# Patient Record
Sex: FEMALE | Race: WHITE | HISPANIC OR LATINO | URBAN - METROPOLITAN AREA
[De-identification: names, ages, dates, MRNs, and addresses within clinical notes are randomized per-mention and may not be internally consistent; named-entity substitution may affect disease eponyms.]

---

## 2017-05-25 ENCOUNTER — OUTPATIENT (OUTPATIENT)
Dept: OUTPATIENT SERVICES | Facility: HOSPITAL | Age: 42
LOS: 1 days | Discharge: HOME | End: 2017-05-25

## 2017-06-22 ENCOUNTER — OUTPATIENT (OUTPATIENT)
Dept: OUTPATIENT SERVICES | Facility: HOSPITAL | Age: 42
LOS: 1 days | Discharge: HOME | End: 2017-06-22

## 2017-06-22 DIAGNOSIS — F25.9 SCHIZOAFFECTIVE DISORDER, UNSPECIFIED: ICD-10-CM

## 2017-06-28 DIAGNOSIS — F19.94 OTHER PSYCHOACTIVE SUBSTANCE USE, UNSPECIFIED WITH PSYCHOACTIVE SUBSTANCE-INDUCED MOOD DISORDER: ICD-10-CM

## 2017-06-28 DIAGNOSIS — E78.2 MIXED HYPERLIPIDEMIA: ICD-10-CM

## 2017-06-28 DIAGNOSIS — B20 HUMAN IMMUNODEFICIENCY VIRUS [HIV] DISEASE: ICD-10-CM

## 2017-06-28 DIAGNOSIS — B97.7 PAPILLOMAVIRUS AS THE CAUSE OF DISEASES CLASSIFIED ELSEWHERE: ICD-10-CM

## 2017-07-12 ENCOUNTER — OUTPATIENT (OUTPATIENT)
Dept: OUTPATIENT SERVICES | Facility: HOSPITAL | Age: 42
LOS: 1 days | Discharge: HOME | End: 2017-07-12

## 2017-07-12 DIAGNOSIS — B97.7 PAPILLOMAVIRUS AS THE CAUSE OF DISEASES CLASSIFIED ELSEWHERE: ICD-10-CM

## 2017-07-12 DIAGNOSIS — N64.3 GALACTORRHEA NOT ASSOCIATED WITH CHILDBIRTH: ICD-10-CM

## 2017-07-12 DIAGNOSIS — F25.9 SCHIZOAFFECTIVE DISORDER, UNSPECIFIED: ICD-10-CM

## 2017-07-20 ENCOUNTER — OUTPATIENT (OUTPATIENT)
Dept: OUTPATIENT SERVICES | Facility: HOSPITAL | Age: 42
LOS: 1 days | Discharge: HOME | End: 2017-07-20

## 2017-07-20 DIAGNOSIS — F19.94 OTHER PSYCHOACTIVE SUBSTANCE USE, UNSPECIFIED WITH PSYCHOACTIVE SUBSTANCE-INDUCED MOOD DISORDER: ICD-10-CM

## 2017-07-20 DIAGNOSIS — B20 HUMAN IMMUNODEFICIENCY VIRUS [HIV] DISEASE: ICD-10-CM

## 2017-07-20 DIAGNOSIS — F25.9 SCHIZOAFFECTIVE DISORDER, UNSPECIFIED: ICD-10-CM

## 2017-07-24 ENCOUNTER — OUTPATIENT (OUTPATIENT)
Dept: OUTPATIENT SERVICES | Facility: HOSPITAL | Age: 42
LOS: 1 days | Discharge: HOME | End: 2017-07-24

## 2017-07-24 DIAGNOSIS — Z12.31 ENCOUNTER FOR SCREENING MAMMOGRAM FOR MALIGNANT NEOPLASM OF BREAST: ICD-10-CM

## 2017-07-24 DIAGNOSIS — F25.9 SCHIZOAFFECTIVE DISORDER, UNSPECIFIED: ICD-10-CM

## 2017-08-01 ENCOUNTER — APPOINTMENT (OUTPATIENT)
Dept: SURGERY | Facility: CLINIC | Age: 42
End: 2017-08-01
Payer: MEDICAID

## 2017-08-01 VITALS
SYSTOLIC BLOOD PRESSURE: 116 MMHG | WEIGHT: 161 LBS | DIASTOLIC BLOOD PRESSURE: 74 MMHG | HEIGHT: 67 IN | BODY MASS INDEX: 25.27 KG/M2

## 2017-08-01 DIAGNOSIS — Z80.3 FAMILY HISTORY OF MALIGNANT NEOPLASM OF BREAST: ICD-10-CM

## 2017-08-01 DIAGNOSIS — Z87.898 PERSONAL HISTORY OF OTHER SPECIFIED CONDITIONS: ICD-10-CM

## 2017-08-01 DIAGNOSIS — Z81.8 FAMILY HISTORY OF OTHER MENTAL AND BEHAVIORAL DISORDERS: ICD-10-CM

## 2017-08-01 DIAGNOSIS — Z86.59 PERSONAL HISTORY OF OTHER MENTAL AND BEHAVIORAL DISORDERS: ICD-10-CM

## 2017-08-01 DIAGNOSIS — Z87.19 PERSONAL HISTORY OF OTHER DISEASES OF THE DIGESTIVE SYSTEM: ICD-10-CM

## 2017-08-01 PROCEDURE — 46600 DIAGNOSTIC ANOSCOPY SPX: CPT

## 2017-08-01 PROCEDURE — 99203 OFFICE O/P NEW LOW 30 MIN: CPT | Mod: 25

## 2017-08-02 PROBLEM — Z80.3 FAMILY HISTORY OF MALIGNANT NEOPLASM OF BREAST: Status: ACTIVE | Noted: 2017-08-01

## 2017-08-02 PROBLEM — Z81.8 FAMILY HISTORY OF BIPOLAR DISORDER: Status: ACTIVE | Noted: 2017-08-01

## 2017-08-02 PROBLEM — Z87.898 HISTORY OF HEARTBURN: Status: RESOLVED | Noted: 2017-08-01 | Resolved: 2017-08-02

## 2017-08-02 PROBLEM — Z87.19 HISTORY OF INDIGESTION: Status: RESOLVED | Noted: 2017-08-01 | Resolved: 2017-08-02

## 2017-08-02 PROBLEM — Z86.59 HISTORY OF MENTAL DISORDER: Status: RESOLVED | Noted: 2017-08-01 | Resolved: 2017-08-02

## 2017-08-02 RX ORDER — QUETIAPINE FUMARATE 50 MG/1
50 TABLET ORAL
Refills: 0 | Status: ACTIVE | COMMUNITY

## 2017-08-02 RX ORDER — MIRTAZAPINE 15 MG/1
15 TABLET, FILM COATED ORAL
Refills: 0 | Status: ACTIVE | COMMUNITY

## 2017-08-02 RX ORDER — BENZTROPINE MESYLATE 2 MG/1
2 TABLET ORAL
Refills: 0 | Status: ACTIVE | COMMUNITY

## 2017-08-02 RX ORDER — PERPHENAZINE 8 MG/1
8 TABLET ORAL
Refills: 0 | Status: ACTIVE | COMMUNITY

## 2017-08-17 ENCOUNTER — OUTPATIENT (OUTPATIENT)
Dept: OUTPATIENT SERVICES | Facility: HOSPITAL | Age: 42
LOS: 1 days | Discharge: HOME | End: 2017-08-17

## 2017-08-17 DIAGNOSIS — J44.9 CHRONIC OBSTRUCTIVE PULMONARY DISEASE, UNSPECIFIED: ICD-10-CM

## 2017-08-17 DIAGNOSIS — F25.9 SCHIZOAFFECTIVE DISORDER, UNSPECIFIED: ICD-10-CM

## 2017-08-17 DIAGNOSIS — F19.94 OTHER PSYCHOACTIVE SUBSTANCE USE, UNSPECIFIED WITH PSYCHOACTIVE SUBSTANCE-INDUCED MOOD DISORDER: ICD-10-CM

## 2017-08-17 DIAGNOSIS — E78.2 MIXED HYPERLIPIDEMIA: ICD-10-CM

## 2017-08-17 DIAGNOSIS — B20 HUMAN IMMUNODEFICIENCY VIRUS [HIV] DISEASE: ICD-10-CM

## 2017-09-14 ENCOUNTER — OUTPATIENT (OUTPATIENT)
Dept: OUTPATIENT SERVICES | Facility: HOSPITAL | Age: 42
LOS: 1 days | Discharge: HOME | End: 2017-09-14

## 2017-09-14 DIAGNOSIS — F25.9 SCHIZOAFFECTIVE DISORDER, UNSPECIFIED: ICD-10-CM

## 2017-09-14 DIAGNOSIS — B20 HUMAN IMMUNODEFICIENCY VIRUS [HIV] DISEASE: ICD-10-CM

## 2017-09-14 DIAGNOSIS — E78.2 MIXED HYPERLIPIDEMIA: ICD-10-CM

## 2017-10-12 ENCOUNTER — OUTPATIENT (OUTPATIENT)
Dept: OUTPATIENT SERVICES | Facility: HOSPITAL | Age: 42
LOS: 1 days | Discharge: HOME | End: 2017-10-12

## 2017-10-12 DIAGNOSIS — F19.94 OTHER PSYCHOACTIVE SUBSTANCE USE, UNSPECIFIED WITH PSYCHOACTIVE SUBSTANCE-INDUCED MOOD DISORDER: ICD-10-CM

## 2017-10-12 DIAGNOSIS — F25.9 SCHIZOAFFECTIVE DISORDER, UNSPECIFIED: ICD-10-CM

## 2017-10-12 DIAGNOSIS — B20 HUMAN IMMUNODEFICIENCY VIRUS [HIV] DISEASE: ICD-10-CM

## 2017-10-12 DIAGNOSIS — Z23 ENCOUNTER FOR IMMUNIZATION: ICD-10-CM

## 2017-11-09 ENCOUNTER — OUTPATIENT (OUTPATIENT)
Dept: OUTPATIENT SERVICES | Facility: HOSPITAL | Age: 42
LOS: 1 days | Discharge: HOME | End: 2017-11-09

## 2017-11-09 DIAGNOSIS — J44.9 CHRONIC OBSTRUCTIVE PULMONARY DISEASE, UNSPECIFIED: ICD-10-CM

## 2017-11-09 DIAGNOSIS — F25.9 SCHIZOAFFECTIVE DISORDER, UNSPECIFIED: ICD-10-CM

## 2017-11-09 DIAGNOSIS — B20 HUMAN IMMUNODEFICIENCY VIRUS [HIV] DISEASE: ICD-10-CM

## 2017-11-09 DIAGNOSIS — E78.2 MIXED HYPERLIPIDEMIA: ICD-10-CM

## 2018-02-01 ENCOUNTER — OUTPATIENT (OUTPATIENT)
Dept: OUTPATIENT SERVICES | Facility: HOSPITAL | Age: 43
LOS: 1 days | Discharge: HOME | End: 2018-02-01

## 2018-02-01 DIAGNOSIS — B20 HUMAN IMMUNODEFICIENCY VIRUS [HIV] DISEASE: ICD-10-CM

## 2018-02-04 DIAGNOSIS — F25.9 SCHIZOAFFECTIVE DISORDER, UNSPECIFIED: ICD-10-CM

## 2018-02-27 ENCOUNTER — APPOINTMENT (OUTPATIENT)
Dept: SURGERY | Facility: CLINIC | Age: 43
End: 2018-02-27
Payer: MEDICAID

## 2018-02-27 VITALS
DIASTOLIC BLOOD PRESSURE: 78 MMHG | BODY MASS INDEX: 25.74 KG/M2 | HEIGHT: 67 IN | WEIGHT: 164 LBS | SYSTOLIC BLOOD PRESSURE: 118 MMHG

## 2018-02-27 PROCEDURE — 99213 OFFICE O/P EST LOW 20 MIN: CPT

## 2018-03-01 ENCOUNTER — OUTPATIENT (OUTPATIENT)
Dept: OUTPATIENT SERVICES | Facility: HOSPITAL | Age: 43
LOS: 1 days | Discharge: HOME | End: 2018-03-01

## 2018-03-01 DIAGNOSIS — E66.3 OVERWEIGHT: ICD-10-CM

## 2018-03-01 DIAGNOSIS — E78.2 MIXED HYPERLIPIDEMIA: ICD-10-CM

## 2018-03-01 DIAGNOSIS — B20 HUMAN IMMUNODEFICIENCY VIRUS [HIV] DISEASE: ICD-10-CM

## 2018-03-01 DIAGNOSIS — F19.94 OTHER PSYCHOACTIVE SUBSTANCE USE, UNSPECIFIED WITH PSYCHOACTIVE SUBSTANCE-INDUCED MOOD DISORDER: ICD-10-CM

## 2018-03-01 DIAGNOSIS — J44.9 CHRONIC OBSTRUCTIVE PULMONARY DISEASE, UNSPECIFIED: ICD-10-CM

## 2018-03-29 ENCOUNTER — OUTPATIENT (OUTPATIENT)
Dept: OUTPATIENT SERVICES | Facility: HOSPITAL | Age: 43
LOS: 1 days | Discharge: HOME | End: 2018-03-29

## 2018-03-29 DIAGNOSIS — J44.9 CHRONIC OBSTRUCTIVE PULMONARY DISEASE, UNSPECIFIED: ICD-10-CM

## 2018-03-29 DIAGNOSIS — B20 HUMAN IMMUNODEFICIENCY VIRUS [HIV] DISEASE: ICD-10-CM

## 2018-03-29 DIAGNOSIS — E78.2 MIXED HYPERLIPIDEMIA: ICD-10-CM

## 2018-03-29 DIAGNOSIS — F19.94 OTHER PSYCHOACTIVE SUBSTANCE USE, UNSPECIFIED WITH PSYCHOACTIVE SUBSTANCE-INDUCED MOOD DISORDER: ICD-10-CM

## 2018-03-29 DIAGNOSIS — E66.3 OVERWEIGHT: ICD-10-CM

## 2018-04-26 ENCOUNTER — OUTPATIENT (OUTPATIENT)
Dept: OUTPATIENT SERVICES | Facility: HOSPITAL | Age: 43
LOS: 1 days | Discharge: HOME | End: 2018-04-26

## 2018-04-26 DIAGNOSIS — J44.9 CHRONIC OBSTRUCTIVE PULMONARY DISEASE, UNSPECIFIED: ICD-10-CM

## 2018-04-26 DIAGNOSIS — E66.3 OVERWEIGHT: ICD-10-CM

## 2018-04-26 DIAGNOSIS — F19.94 OTHER PSYCHOACTIVE SUBSTANCE USE, UNSPECIFIED WITH PSYCHOACTIVE SUBSTANCE-INDUCED MOOD DISORDER: ICD-10-CM

## 2018-04-26 DIAGNOSIS — E78.2 MIXED HYPERLIPIDEMIA: ICD-10-CM

## 2018-04-26 DIAGNOSIS — B20 HUMAN IMMUNODEFICIENCY VIRUS [HIV] DISEASE: ICD-10-CM

## 2018-05-24 ENCOUNTER — OUTPATIENT (OUTPATIENT)
Dept: OUTPATIENT SERVICES | Facility: HOSPITAL | Age: 43
LOS: 1 days | Discharge: HOME | End: 2018-05-24

## 2018-05-24 DIAGNOSIS — E78.2 MIXED HYPERLIPIDEMIA: ICD-10-CM

## 2018-05-24 DIAGNOSIS — E66.3 OVERWEIGHT: ICD-10-CM

## 2018-05-24 DIAGNOSIS — B20 HUMAN IMMUNODEFICIENCY VIRUS [HIV] DISEASE: ICD-10-CM

## 2018-06-21 ENCOUNTER — OUTPATIENT (OUTPATIENT)
Dept: OUTPATIENT SERVICES | Facility: HOSPITAL | Age: 43
LOS: 1 days | Discharge: HOME | End: 2018-06-21

## 2018-06-21 DIAGNOSIS — E78.2 MIXED HYPERLIPIDEMIA: ICD-10-CM

## 2018-06-21 DIAGNOSIS — B20 HUMAN IMMUNODEFICIENCY VIRUS [HIV] DISEASE: ICD-10-CM

## 2018-07-19 ENCOUNTER — OUTPATIENT (OUTPATIENT)
Dept: OUTPATIENT SERVICES | Facility: HOSPITAL | Age: 43
LOS: 1 days | Discharge: HOME | End: 2018-07-19

## 2018-07-19 DIAGNOSIS — B20 HUMAN IMMUNODEFICIENCY VIRUS [HIV] DISEASE: ICD-10-CM

## 2018-07-19 DIAGNOSIS — F19.94 OTHER PSYCHOACTIVE SUBSTANCE USE, UNSPECIFIED WITH PSYCHOACTIVE SUBSTANCE-INDUCED MOOD DISORDER: ICD-10-CM

## 2018-07-19 DIAGNOSIS — I10 ESSENTIAL (PRIMARY) HYPERTENSION: ICD-10-CM

## 2018-07-19 DIAGNOSIS — E78.2 MIXED HYPERLIPIDEMIA: ICD-10-CM

## 2018-07-19 DIAGNOSIS — B97.7 PAPILLOMAVIRUS AS THE CAUSE OF DISEASES CLASSIFIED ELSEWHERE: ICD-10-CM

## 2018-08-16 ENCOUNTER — OUTPATIENT (OUTPATIENT)
Dept: OUTPATIENT SERVICES | Facility: HOSPITAL | Age: 43
LOS: 1 days | Discharge: HOME | End: 2018-08-16

## 2018-08-16 DIAGNOSIS — E66.3 OVERWEIGHT: ICD-10-CM

## 2018-08-16 DIAGNOSIS — B20 HUMAN IMMUNODEFICIENCY VIRUS [HIV] DISEASE: ICD-10-CM

## 2018-08-16 DIAGNOSIS — E78.2 MIXED HYPERLIPIDEMIA: ICD-10-CM

## 2018-08-16 DIAGNOSIS — I10 ESSENTIAL (PRIMARY) HYPERTENSION: ICD-10-CM

## 2018-09-11 ENCOUNTER — OUTPATIENT (OUTPATIENT)
Dept: OUTPATIENT SERVICES | Facility: HOSPITAL | Age: 43
LOS: 1 days | Discharge: HOME | End: 2018-09-11

## 2018-09-11 DIAGNOSIS — B20 HUMAN IMMUNODEFICIENCY VIRUS [HIV] DISEASE: ICD-10-CM

## 2018-09-11 DIAGNOSIS — Z23 ENCOUNTER FOR IMMUNIZATION: ICD-10-CM

## 2018-09-12 ENCOUNTER — OUTPATIENT (OUTPATIENT)
Dept: OUTPATIENT SERVICES | Facility: HOSPITAL | Age: 43
LOS: 1 days | Discharge: HOME | End: 2018-09-12

## 2018-09-12 ENCOUNTER — RESULT REVIEW (OUTPATIENT)
Age: 43
End: 2018-09-12

## 2018-09-12 DIAGNOSIS — B97.7 PAPILLOMAVIRUS AS THE CAUSE OF DISEASES CLASSIFIED ELSEWHERE: ICD-10-CM

## 2018-09-12 DIAGNOSIS — Z01.419 ENCOUNTER FOR GYNECOLOGICAL EXAMINATION (GENERAL) (ROUTINE) WITHOUT ABNORMAL FINDINGS: ICD-10-CM

## 2018-09-13 ENCOUNTER — OUTPATIENT (OUTPATIENT)
Dept: OUTPATIENT SERVICES | Facility: HOSPITAL | Age: 43
LOS: 1 days | Discharge: HOME | End: 2018-09-13

## 2018-09-13 DIAGNOSIS — E78.2 MIXED HYPERLIPIDEMIA: ICD-10-CM

## 2018-09-13 DIAGNOSIS — F19.94 OTHER PSYCHOACTIVE SUBSTANCE USE, UNSPECIFIED WITH PSYCHOACTIVE SUBSTANCE-INDUCED MOOD DISORDER: ICD-10-CM

## 2018-09-13 DIAGNOSIS — B20 HUMAN IMMUNODEFICIENCY VIRUS [HIV] DISEASE: ICD-10-CM

## 2018-09-13 DIAGNOSIS — E66.3 OVERWEIGHT: ICD-10-CM

## 2018-09-13 DIAGNOSIS — I10 ESSENTIAL (PRIMARY) HYPERTENSION: ICD-10-CM

## 2018-09-13 LAB
C TRACH RRNA SPEC QL NAA+PROBE: SIGNIFICANT CHANGE UP
N GONORRHOEA RRNA SPEC QL NAA+PROBE: SIGNIFICANT CHANGE UP
SPECIMEN SOURCE: SIGNIFICANT CHANGE UP

## 2018-09-14 LAB — NON-GYNECOLOGICAL CYTOLOGY STUDY: SIGNIFICANT CHANGE UP

## 2018-09-18 ENCOUNTER — APPOINTMENT (OUTPATIENT)
Dept: SURGERY | Facility: CLINIC | Age: 43
End: 2018-09-18
Payer: MEDICAID

## 2018-09-18 VITALS
DIASTOLIC BLOOD PRESSURE: 80 MMHG | WEIGHT: 163 LBS | SYSTOLIC BLOOD PRESSURE: 110 MMHG | BODY MASS INDEX: 25.58 KG/M2 | HEIGHT: 67 IN

## 2018-09-18 DIAGNOSIS — Z00.00 ENCOUNTER FOR GENERAL ADULT MEDICAL EXAMINATION WITHOUT ABNORMAL FINDINGS: ICD-10-CM

## 2018-09-18 LAB
HPV DNA HIGH-RISK, ANAL/RECTAL: SIGNIFICANT CHANGE UP
HPV HIGH+LOW RISK DNA PNL CVX: SIGNIFICANT CHANGE UP

## 2018-09-18 PROCEDURE — 46600 DIAGNOSTIC ANOSCOPY SPX: CPT

## 2018-09-18 PROCEDURE — 99213 OFFICE O/P EST LOW 20 MIN: CPT | Mod: 25

## 2018-09-26 ENCOUNTER — OUTPATIENT (OUTPATIENT)
Dept: OUTPATIENT SERVICES | Facility: HOSPITAL | Age: 43
LOS: 1 days | Discharge: HOME | End: 2018-09-26

## 2018-09-26 DIAGNOSIS — Z12.31 ENCOUNTER FOR SCREENING MAMMOGRAM FOR MALIGNANT NEOPLASM OF BREAST: ICD-10-CM

## 2018-09-28 LAB — CYTOLOGY SPEC DOC CYTO: SIGNIFICANT CHANGE UP

## 2018-10-05 ENCOUNTER — OUTPATIENT (OUTPATIENT)
Dept: OUTPATIENT SERVICES | Facility: HOSPITAL | Age: 43
LOS: 1 days | Discharge: HOME | End: 2018-10-05

## 2018-10-05 DIAGNOSIS — R92.8 OTHER ABNORMAL AND INCONCLUSIVE FINDINGS ON DIAGNOSTIC IMAGING OF BREAST: ICD-10-CM

## 2018-10-09 ENCOUNTER — APPOINTMENT (OUTPATIENT)
Dept: SURGERY | Facility: CLINIC | Age: 43
End: 2018-10-09
Payer: MEDICAID

## 2018-10-09 VITALS
HEIGHT: 67 IN | SYSTOLIC BLOOD PRESSURE: 118 MMHG | WEIGHT: 160 LBS | BODY MASS INDEX: 25.11 KG/M2 | DIASTOLIC BLOOD PRESSURE: 78 MMHG

## 2018-10-09 PROCEDURE — 99213 OFFICE O/P EST LOW 20 MIN: CPT

## 2018-10-11 ENCOUNTER — OUTPATIENT (OUTPATIENT)
Dept: OUTPATIENT SERVICES | Facility: HOSPITAL | Age: 43
LOS: 1 days | Discharge: HOME | End: 2018-10-11

## 2018-10-11 DIAGNOSIS — J44.9 CHRONIC OBSTRUCTIVE PULMONARY DISEASE, UNSPECIFIED: ICD-10-CM

## 2018-10-11 DIAGNOSIS — E78.2 MIXED HYPERLIPIDEMIA: ICD-10-CM

## 2018-10-11 DIAGNOSIS — B20 HUMAN IMMUNODEFICIENCY VIRUS [HIV] DISEASE: ICD-10-CM

## 2018-10-11 DIAGNOSIS — I10 ESSENTIAL (PRIMARY) HYPERTENSION: ICD-10-CM

## 2018-10-11 DIAGNOSIS — Z23 ENCOUNTER FOR IMMUNIZATION: ICD-10-CM

## 2018-10-11 DIAGNOSIS — B97.7 PAPILLOMAVIRUS AS THE CAUSE OF DISEASES CLASSIFIED ELSEWHERE: ICD-10-CM

## 2018-11-08 ENCOUNTER — OUTPATIENT (OUTPATIENT)
Dept: OUTPATIENT SERVICES | Facility: HOSPITAL | Age: 43
LOS: 1 days | Discharge: HOME | End: 2018-11-08

## 2018-11-08 DIAGNOSIS — B20 HUMAN IMMUNODEFICIENCY VIRUS [HIV] DISEASE: ICD-10-CM

## 2018-11-08 DIAGNOSIS — B97.7 PAPILLOMAVIRUS AS THE CAUSE OF DISEASES CLASSIFIED ELSEWHERE: ICD-10-CM

## 2018-11-08 DIAGNOSIS — E78.5 HYPERLIPIDEMIA, UNSPECIFIED: ICD-10-CM

## 2018-11-08 DIAGNOSIS — F19.94 OTHER PSYCHOACTIVE SUBSTANCE USE, UNSPECIFIED WITH PSYCHOACTIVE SUBSTANCE-INDUCED MOOD DISORDER: ICD-10-CM

## 2018-11-20 ENCOUNTER — OUTPATIENT (OUTPATIENT)
Dept: OUTPATIENT SERVICES | Facility: HOSPITAL | Age: 43
LOS: 1 days | Discharge: HOME | End: 2018-11-20

## 2018-11-20 DIAGNOSIS — B20 HUMAN IMMUNODEFICIENCY VIRUS [HIV] DISEASE: ICD-10-CM

## 2018-11-20 DIAGNOSIS — R11.0 NAUSEA: ICD-10-CM

## 2019-01-03 ENCOUNTER — OUTPATIENT (OUTPATIENT)
Dept: OUTPATIENT SERVICES | Facility: HOSPITAL | Age: 44
LOS: 1 days | Discharge: HOME | End: 2019-01-03

## 2019-01-03 DIAGNOSIS — B20 HUMAN IMMUNODEFICIENCY VIRUS [HIV] DISEASE: ICD-10-CM

## 2019-01-03 DIAGNOSIS — E78.2 MIXED HYPERLIPIDEMIA: ICD-10-CM

## 2019-01-03 DIAGNOSIS — I10 ESSENTIAL (PRIMARY) HYPERTENSION: ICD-10-CM

## 2019-01-28 ENCOUNTER — OUTPATIENT (OUTPATIENT)
Dept: OUTPATIENT SERVICES | Facility: HOSPITAL | Age: 44
LOS: 1 days | Discharge: HOME | End: 2019-01-28

## 2019-01-29 DIAGNOSIS — B20 HUMAN IMMUNODEFICIENCY VIRUS [HIV] DISEASE: ICD-10-CM

## 2019-01-31 ENCOUNTER — OUTPATIENT (OUTPATIENT)
Dept: OUTPATIENT SERVICES | Facility: HOSPITAL | Age: 44
LOS: 1 days | Discharge: HOME | End: 2019-01-31

## 2019-01-31 DIAGNOSIS — B20 HUMAN IMMUNODEFICIENCY VIRUS [HIV] DISEASE: ICD-10-CM

## 2019-01-31 DIAGNOSIS — B97.7 PAPILLOMAVIRUS AS THE CAUSE OF DISEASES CLASSIFIED ELSEWHERE: ICD-10-CM

## 2019-01-31 DIAGNOSIS — J44.9 CHRONIC OBSTRUCTIVE PULMONARY DISEASE, UNSPECIFIED: ICD-10-CM

## 2019-01-31 DIAGNOSIS — I10 ESSENTIAL (PRIMARY) HYPERTENSION: ICD-10-CM

## 2019-01-31 DIAGNOSIS — Z23 ENCOUNTER FOR IMMUNIZATION: ICD-10-CM

## 2019-01-31 DIAGNOSIS — E78.2 MIXED HYPERLIPIDEMIA: ICD-10-CM

## 2019-01-31 DIAGNOSIS — F19.94 OTHER PSYCHOACTIVE SUBSTANCE USE, UNSPECIFIED WITH PSYCHOACTIVE SUBSTANCE-INDUCED MOOD DISORDER: ICD-10-CM

## 2019-02-26 ENCOUNTER — OUTPATIENT (OUTPATIENT)
Dept: OUTPATIENT SERVICES | Facility: HOSPITAL | Age: 44
LOS: 1 days | Discharge: HOME | End: 2019-02-26

## 2019-03-20 DIAGNOSIS — B20 HUMAN IMMUNODEFICIENCY VIRUS [HIV] DISEASE: ICD-10-CM

## 2019-03-28 ENCOUNTER — OUTPATIENT (OUTPATIENT)
Dept: OUTPATIENT SERVICES | Facility: HOSPITAL | Age: 44
LOS: 1 days | Discharge: HOME | End: 2019-03-28

## 2019-03-28 DIAGNOSIS — J44.9 CHRONIC OBSTRUCTIVE PULMONARY DISEASE, UNSPECIFIED: ICD-10-CM

## 2019-03-28 DIAGNOSIS — I10 ESSENTIAL (PRIMARY) HYPERTENSION: ICD-10-CM

## 2019-03-28 DIAGNOSIS — B20 HUMAN IMMUNODEFICIENCY VIRUS [HIV] DISEASE: ICD-10-CM

## 2019-03-28 DIAGNOSIS — E78.2 MIXED HYPERLIPIDEMIA: ICD-10-CM

## 2019-03-28 DIAGNOSIS — F19.94 OTHER PSYCHOACTIVE SUBSTANCE USE, UNSPECIFIED WITH PSYCHOACTIVE SUBSTANCE-INDUCED MOOD DISORDER: ICD-10-CM

## 2019-04-25 ENCOUNTER — OUTPATIENT (OUTPATIENT)
Dept: OUTPATIENT SERVICES | Facility: HOSPITAL | Age: 44
LOS: 1 days | Discharge: HOME | End: 2019-04-25

## 2019-04-25 DIAGNOSIS — B97.7 PAPILLOMAVIRUS AS THE CAUSE OF DISEASES CLASSIFIED ELSEWHERE: ICD-10-CM

## 2019-04-25 DIAGNOSIS — J44.9 CHRONIC OBSTRUCTIVE PULMONARY DISEASE, UNSPECIFIED: ICD-10-CM

## 2019-04-25 DIAGNOSIS — B20 HUMAN IMMUNODEFICIENCY VIRUS [HIV] DISEASE: ICD-10-CM

## 2019-04-25 DIAGNOSIS — E78.2 MIXED HYPERLIPIDEMIA: ICD-10-CM

## 2019-04-25 DIAGNOSIS — F19.94 OTHER PSYCHOACTIVE SUBSTANCE USE, UNSPECIFIED WITH PSYCHOACTIVE SUBSTANCE-INDUCED MOOD DISORDER: ICD-10-CM

## 2019-04-25 DIAGNOSIS — I10 ESSENTIAL (PRIMARY) HYPERTENSION: ICD-10-CM

## 2019-06-17 ENCOUNTER — OUTPATIENT (OUTPATIENT)
Dept: OUTPATIENT SERVICES | Facility: HOSPITAL | Age: 44
LOS: 1 days | Discharge: HOME | End: 2019-06-17

## 2019-06-20 ENCOUNTER — OUTPATIENT (OUTPATIENT)
Dept: OUTPATIENT SERVICES | Facility: HOSPITAL | Age: 44
LOS: 1 days | Discharge: HOME | End: 2019-06-20

## 2019-06-20 DIAGNOSIS — B97.7 PAPILLOMAVIRUS AS THE CAUSE OF DISEASES CLASSIFIED ELSEWHERE: ICD-10-CM

## 2019-06-20 DIAGNOSIS — I10 ESSENTIAL (PRIMARY) HYPERTENSION: ICD-10-CM

## 2019-06-20 DIAGNOSIS — Z23 ENCOUNTER FOR IMMUNIZATION: ICD-10-CM

## 2019-06-20 DIAGNOSIS — E78.2 MIXED HYPERLIPIDEMIA: ICD-10-CM

## 2019-06-20 DIAGNOSIS — B20 HUMAN IMMUNODEFICIENCY VIRUS [HIV] DISEASE: ICD-10-CM

## 2019-07-18 ENCOUNTER — OUTPATIENT (OUTPATIENT)
Dept: OUTPATIENT SERVICES | Facility: HOSPITAL | Age: 44
LOS: 1 days | Discharge: HOME | End: 2019-07-18

## 2019-07-18 DIAGNOSIS — E78.2 MIXED HYPERLIPIDEMIA: ICD-10-CM

## 2019-07-18 DIAGNOSIS — I10 ESSENTIAL (PRIMARY) HYPERTENSION: ICD-10-CM

## 2019-07-18 DIAGNOSIS — B20 HUMAN IMMUNODEFICIENCY VIRUS [HIV] DISEASE: ICD-10-CM

## 2019-07-18 DIAGNOSIS — J44.9 CHRONIC OBSTRUCTIVE PULMONARY DISEASE, UNSPECIFIED: ICD-10-CM

## 2019-07-18 DIAGNOSIS — B97.7 PAPILLOMAVIRUS AS THE CAUSE OF DISEASES CLASSIFIED ELSEWHERE: ICD-10-CM

## 2019-08-15 ENCOUNTER — OUTPATIENT (OUTPATIENT)
Dept: OUTPATIENT SERVICES | Facility: HOSPITAL | Age: 44
LOS: 1 days | Discharge: HOME | End: 2019-08-15

## 2019-08-15 DIAGNOSIS — B20 HUMAN IMMUNODEFICIENCY VIRUS [HIV] DISEASE: ICD-10-CM

## 2019-08-15 DIAGNOSIS — F19.94 OTHER PSYCHOACTIVE SUBSTANCE USE, UNSPECIFIED WITH PSYCHOACTIVE SUBSTANCE-INDUCED MOOD DISORDER: ICD-10-CM

## 2019-08-15 DIAGNOSIS — E78.2 MIXED HYPERLIPIDEMIA: ICD-10-CM

## 2019-08-15 DIAGNOSIS — I10 ESSENTIAL (PRIMARY) HYPERTENSION: ICD-10-CM

## 2019-09-12 ENCOUNTER — OUTPATIENT (OUTPATIENT)
Dept: OUTPATIENT SERVICES | Facility: HOSPITAL | Age: 44
LOS: 1 days | Discharge: HOME | End: 2019-09-12

## 2019-09-12 DIAGNOSIS — E78.2 MIXED HYPERLIPIDEMIA: ICD-10-CM

## 2019-09-12 DIAGNOSIS — Z23 ENCOUNTER FOR IMMUNIZATION: ICD-10-CM

## 2019-09-12 DIAGNOSIS — J44.9 CHRONIC OBSTRUCTIVE PULMONARY DISEASE, UNSPECIFIED: ICD-10-CM

## 2019-09-12 DIAGNOSIS — B20 HUMAN IMMUNODEFICIENCY VIRUS [HIV] DISEASE: ICD-10-CM

## 2019-09-12 DIAGNOSIS — I10 ESSENTIAL (PRIMARY) HYPERTENSION: ICD-10-CM

## 2019-09-12 DIAGNOSIS — B97.7 PAPILLOMAVIRUS AS THE CAUSE OF DISEASES CLASSIFIED ELSEWHERE: ICD-10-CM

## 2019-10-02 ENCOUNTER — RESULT REVIEW (OUTPATIENT)
Age: 44
End: 2019-10-02

## 2019-10-02 ENCOUNTER — OUTPATIENT (OUTPATIENT)
Dept: OUTPATIENT SERVICES | Facility: HOSPITAL | Age: 44
LOS: 1 days | Discharge: HOME | End: 2019-10-02
Payer: COMMERCIAL

## 2019-10-02 DIAGNOSIS — B97.7 PAPILLOMAVIRUS AS THE CAUSE OF DISEASES CLASSIFIED ELSEWHERE: ICD-10-CM

## 2019-10-02 DIAGNOSIS — Z01.419 ENCOUNTER FOR GYNECOLOGICAL EXAMINATION (GENERAL) (ROUTINE) WITHOUT ABNORMAL FINDINGS: ICD-10-CM

## 2019-10-02 PROCEDURE — 88112 CYTOPATH CELL ENHANCE TECH: CPT | Mod: 26

## 2019-10-04 LAB — NON-GYNECOLOGICAL CYTOLOGY STUDY: SIGNIFICANT CHANGE UP

## 2019-10-07 LAB
CYTOLOGY SPEC DOC CYTO: SIGNIFICANT CHANGE UP
HPV DNA HIGH-RISK, ANAL/RECTAL: SIGNIFICANT CHANGE UP
HPV HIGH+LOW RISK DNA PNL CVX: SIGNIFICANT CHANGE UP

## 2019-10-14 DIAGNOSIS — B20 HUMAN IMMUNODEFICIENCY VIRUS [HIV] DISEASE: ICD-10-CM

## 2019-10-15 ENCOUNTER — OUTPATIENT (OUTPATIENT)
Dept: OUTPATIENT SERVICES | Facility: HOSPITAL | Age: 44
LOS: 1 days | Discharge: HOME | End: 2019-10-15
Payer: COMMERCIAL

## 2019-10-15 DIAGNOSIS — Z12.31 ENCOUNTER FOR SCREENING MAMMOGRAM FOR MALIGNANT NEOPLASM OF BREAST: ICD-10-CM

## 2019-10-15 DIAGNOSIS — B20 HUMAN IMMUNODEFICIENCY VIRUS [HIV] DISEASE: ICD-10-CM

## 2019-10-15 PROCEDURE — 77063 BREAST TOMOSYNTHESIS BI: CPT | Mod: 26

## 2019-10-15 PROCEDURE — 77067 SCR MAMMO BI INCL CAD: CPT | Mod: 26

## 2019-10-17 ENCOUNTER — OUTPATIENT (OUTPATIENT)
Dept: OUTPATIENT SERVICES | Facility: HOSPITAL | Age: 44
LOS: 1 days | Discharge: HOME | End: 2019-10-17

## 2019-10-17 DIAGNOSIS — I10 ESSENTIAL (PRIMARY) HYPERTENSION: ICD-10-CM

## 2019-10-17 DIAGNOSIS — B20 HUMAN IMMUNODEFICIENCY VIRUS [HIV] DISEASE: ICD-10-CM

## 2019-10-17 DIAGNOSIS — E78.2 MIXED HYPERLIPIDEMIA: ICD-10-CM

## 2019-10-21 DIAGNOSIS — B20 HUMAN IMMUNODEFICIENCY VIRUS [HIV] DISEASE: ICD-10-CM

## 2019-11-14 ENCOUNTER — OUTPATIENT (OUTPATIENT)
Dept: OUTPATIENT SERVICES | Facility: HOSPITAL | Age: 44
LOS: 1 days | Discharge: HOME | End: 2019-11-14

## 2019-11-14 DIAGNOSIS — J44.9 CHRONIC OBSTRUCTIVE PULMONARY DISEASE, UNSPECIFIED: ICD-10-CM

## 2019-11-14 DIAGNOSIS — Z23 ENCOUNTER FOR IMMUNIZATION: ICD-10-CM

## 2019-11-14 DIAGNOSIS — I10 ESSENTIAL (PRIMARY) HYPERTENSION: ICD-10-CM

## 2019-11-14 DIAGNOSIS — E78.2 MIXED HYPERLIPIDEMIA: ICD-10-CM

## 2019-11-14 DIAGNOSIS — B20 HUMAN IMMUNODEFICIENCY VIRUS [HIV] DISEASE: ICD-10-CM

## 2020-01-09 ENCOUNTER — OUTPATIENT (OUTPATIENT)
Dept: OUTPATIENT SERVICES | Facility: HOSPITAL | Age: 45
LOS: 1 days | Discharge: HOME | End: 2020-01-09

## 2020-01-09 DIAGNOSIS — I10 ESSENTIAL (PRIMARY) HYPERTENSION: ICD-10-CM

## 2020-01-09 DIAGNOSIS — E78.2 MIXED HYPERLIPIDEMIA: ICD-10-CM

## 2020-01-09 DIAGNOSIS — B20 HUMAN IMMUNODEFICIENCY VIRUS [HIV] DISEASE: ICD-10-CM

## 2020-01-09 DIAGNOSIS — F19.94 OTHER PSYCHOACTIVE SUBSTANCE USE, UNSPECIFIED WITH PSYCHOACTIVE SUBSTANCE-INDUCED MOOD DISORDER: ICD-10-CM

## 2020-02-06 ENCOUNTER — OUTPATIENT (OUTPATIENT)
Dept: OUTPATIENT SERVICES | Facility: HOSPITAL | Age: 45
LOS: 1 days | Discharge: HOME | End: 2020-02-06

## 2020-02-06 DIAGNOSIS — B20 HUMAN IMMUNODEFICIENCY VIRUS [HIV] DISEASE: ICD-10-CM

## 2020-02-06 DIAGNOSIS — E78.2 MIXED HYPERLIPIDEMIA: ICD-10-CM

## 2020-02-06 DIAGNOSIS — I10 ESSENTIAL (PRIMARY) HYPERTENSION: ICD-10-CM

## 2020-02-06 DIAGNOSIS — B97.7 PAPILLOMAVIRUS AS THE CAUSE OF DISEASES CLASSIFIED ELSEWHERE: ICD-10-CM

## 2020-03-17 ENCOUNTER — OUTPATIENT (OUTPATIENT)
Dept: OUTPATIENT SERVICES | Facility: HOSPITAL | Age: 45
LOS: 1 days | Discharge: HOME | End: 2020-03-17

## 2020-03-31 DIAGNOSIS — F19.94 OTHER PSYCHOACTIVE SUBSTANCE USE, UNSPECIFIED WITH PSYCHOACTIVE SUBSTANCE-INDUCED MOOD DISORDER: ICD-10-CM

## 2020-03-31 DIAGNOSIS — E78.2 MIXED HYPERLIPIDEMIA: ICD-10-CM

## 2020-03-31 DIAGNOSIS — B20 HUMAN IMMUNODEFICIENCY VIRUS [HIV] DISEASE: ICD-10-CM

## 2020-04-09 ENCOUNTER — OUTPATIENT (OUTPATIENT)
Dept: OUTPATIENT SERVICES | Facility: HOSPITAL | Age: 45
LOS: 1 days | Discharge: HOME | End: 2020-04-09

## 2020-04-13 DIAGNOSIS — E78.2 MIXED HYPERLIPIDEMIA: ICD-10-CM

## 2020-04-13 DIAGNOSIS — B20 HUMAN IMMUNODEFICIENCY VIRUS [HIV] DISEASE: ICD-10-CM

## 2020-04-23 ENCOUNTER — OUTPATIENT (OUTPATIENT)
Dept: OUTPATIENT SERVICES | Facility: HOSPITAL | Age: 45
LOS: 1 days | Discharge: HOME | End: 2020-04-23

## 2020-04-24 DIAGNOSIS — E78.2 MIXED HYPERLIPIDEMIA: ICD-10-CM

## 2020-04-24 DIAGNOSIS — B20 HUMAN IMMUNODEFICIENCY VIRUS [HIV] DISEASE: ICD-10-CM

## 2020-04-24 DIAGNOSIS — I10 ESSENTIAL (PRIMARY) HYPERTENSION: ICD-10-CM

## 2020-05-07 ENCOUNTER — OUTPATIENT (OUTPATIENT)
Dept: OUTPATIENT SERVICES | Facility: HOSPITAL | Age: 45
LOS: 1 days | Discharge: HOME | End: 2020-05-07

## 2020-05-11 DIAGNOSIS — B20 HUMAN IMMUNODEFICIENCY VIRUS [HIV] DISEASE: ICD-10-CM

## 2020-05-11 DIAGNOSIS — I10 ESSENTIAL (PRIMARY) HYPERTENSION: ICD-10-CM

## 2020-05-11 DIAGNOSIS — E78.2 MIXED HYPERLIPIDEMIA: ICD-10-CM

## 2020-05-21 ENCOUNTER — OUTPATIENT (OUTPATIENT)
Dept: OUTPATIENT SERVICES | Facility: HOSPITAL | Age: 45
LOS: 1 days | Discharge: HOME | End: 2020-05-21

## 2020-05-22 DIAGNOSIS — E78.2 MIXED HYPERLIPIDEMIA: ICD-10-CM

## 2020-05-22 DIAGNOSIS — I10 ESSENTIAL (PRIMARY) HYPERTENSION: ICD-10-CM

## 2020-05-22 DIAGNOSIS — B20 HUMAN IMMUNODEFICIENCY VIRUS [HIV] DISEASE: ICD-10-CM

## 2020-09-24 ENCOUNTER — OUTPATIENT (OUTPATIENT)
Dept: OUTPATIENT SERVICES | Facility: HOSPITAL | Age: 45
LOS: 1 days | Discharge: HOME | End: 2020-09-24

## 2020-09-24 DIAGNOSIS — E78.5 HYPERLIPIDEMIA, UNSPECIFIED: ICD-10-CM

## 2020-09-24 DIAGNOSIS — B20 HUMAN IMMUNODEFICIENCY VIRUS [HIV] DISEASE: ICD-10-CM

## 2020-09-24 DIAGNOSIS — I10 ESSENTIAL (PRIMARY) HYPERTENSION: ICD-10-CM

## 2020-09-28 LAB
4/8 RATIO: 0.76 RATIO — LOW (ref 1.2–3.4)
A1C WITH ESTIMATED AVERAGE GLUCOSE RESULT: 5 % — SIGNIFICANT CHANGE UP (ref 4–5.6)
ABS CD8: 1376 /UL — HIGH (ref 206–494)
ALBUMIN SERPL ELPH-MCNC: 4.6 G/DL — SIGNIFICANT CHANGE UP (ref 3.5–5.2)
ALP SERPL-CCNC: 69 U/L — SIGNIFICANT CHANGE UP (ref 30–115)
ALT FLD-CCNC: 17 U/L — SIGNIFICANT CHANGE UP (ref 0–41)
ANION GAP SERPL CALC-SCNC: 14 MMOL/L — SIGNIFICANT CHANGE UP (ref 7–14)
APPEARANCE UR: CLEAR — SIGNIFICANT CHANGE UP
AST SERPL-CCNC: 23 U/L — SIGNIFICANT CHANGE UP (ref 0–41)
BACTERIA # UR AUTO: ABNORMAL
BILIRUB SERPL-MCNC: 0.3 MG/DL — SIGNIFICANT CHANGE UP (ref 0.2–1.2)
BILIRUB UR-MCNC: NEGATIVE — SIGNIFICANT CHANGE UP
BUN SERPL-MCNC: 6 MG/DL — LOW (ref 10–20)
CALCIUM SERPL-MCNC: 9.9 MG/DL — SIGNIFICANT CHANGE UP (ref 8.5–10.1)
CD16+CD56+ CELLS NFR BLD: 15 % — SIGNIFICANT CHANGE UP (ref 4–20)
CD16+CD56+ CELLS NFR SPEC: 686 /UL — HIGH (ref 89–385)
CD19 BLASTS SPEC-ACNC: 1361 /UL — HIGH (ref 72–502)
CD19 BLASTS SPEC-ACNC: 30 % — HIGH (ref 10–28)
CD3 BLASTS SPEC-ACNC: 2413 /UL — HIGH (ref 800–2012)
CD3 BLASTS SPEC-ACNC: 53 % — LOW (ref 59–81)
CD4 %: 23 % — LOW (ref 42–58)
CD8 %: 30 % — SIGNIFICANT CHANGE UP (ref 14–30)
CHLORIDE SERPL-SCNC: 105 MMOL/L — SIGNIFICANT CHANGE UP (ref 98–110)
CHOLEST SERPL-MCNC: 122 MG/DL — SIGNIFICANT CHANGE UP (ref 100–200)
CO2 SERPL-SCNC: 20 MMOL/L — SIGNIFICANT CHANGE UP (ref 17–32)
COLOR SPEC: YELLOW — SIGNIFICANT CHANGE UP
CREAT SERPL-MCNC: 1.1 MG/DL — SIGNIFICANT CHANGE UP (ref 0.7–1.5)
DIFF PNL FLD: ABNORMAL
EPI CELLS # UR: ABNORMAL /HPF
ESTIMATED AVERAGE GLUCOSE: 97 MG/DL — SIGNIFICANT CHANGE UP (ref 68–114)
GGT SERPL-CCNC: 23 U/L — SIGNIFICANT CHANGE UP (ref 1–40)
GLUCOSE SERPL-MCNC: 101 MG/DL — HIGH (ref 70–99)
GLUCOSE UR QL: NEGATIVE MG/DL — SIGNIFICANT CHANGE UP
HCT VFR BLD CALC: 38.2 % — SIGNIFICANT CHANGE UP (ref 37–47)
HCV AB S/CO SERPL IA: 0.04 COI — SIGNIFICANT CHANGE UP
HCV AB SERPL-IMP: SIGNIFICANT CHANGE UP
HDLC SERPL-MCNC: 60 MG/DL — SIGNIFICANT CHANGE UP
HGB BLD-MCNC: 12.9 G/DL — SIGNIFICANT CHANGE UP (ref 12–16)
KETONES UR-MCNC: NEGATIVE — SIGNIFICANT CHANGE UP
LDH SERPL L TO P-CCNC: 155 — SIGNIFICANT CHANGE UP (ref 50–242)
LEUKOCYTE ESTERASE UR-ACNC: NEGATIVE — SIGNIFICANT CHANGE UP
LIPID PNL WITH DIRECT LDL SERPL: 57 MG/DL — SIGNIFICANT CHANGE UP (ref 4–129)
MCHC RBC-ENTMCNC: 30.9 PG — SIGNIFICANT CHANGE UP (ref 27–31)
MCHC RBC-ENTMCNC: 33.8 G/DL — SIGNIFICANT CHANGE UP (ref 32–37)
MCV RBC AUTO: 91.4 FL — SIGNIFICANT CHANGE UP (ref 81–99)
NITRITE UR-MCNC: NEGATIVE — SIGNIFICANT CHANGE UP
NRBC # BLD: 0 /100 WBCS — SIGNIFICANT CHANGE UP (ref 0–0)
PH UR: 7 — SIGNIFICANT CHANGE UP (ref 5–8)
PLATELET # BLD AUTO: 293 K/UL — SIGNIFICANT CHANGE UP (ref 130–400)
POTASSIUM SERPL-MCNC: 4.6 MMOL/L — SIGNIFICANT CHANGE UP (ref 3.5–5)
POTASSIUM SERPL-SCNC: 4.6 MMOL/L — SIGNIFICANT CHANGE UP (ref 3.5–5)
PROT SERPL-MCNC: 7.3 G/DL — SIGNIFICANT CHANGE UP (ref 6–8)
PROT UR-MCNC: NEGATIVE MG/DL — SIGNIFICANT CHANGE UP
RBC # BLD: 4.18 M/UL — LOW (ref 4.2–5.4)
RBC # FLD: 12.6 % — SIGNIFICANT CHANGE UP (ref 11.5–14.5)
RBC CASTS # UR COMP ASSIST: SIGNIFICANT CHANGE UP /HPF
SODIUM SERPL-SCNC: 139 MMOL/L — SIGNIFICANT CHANGE UP (ref 135–146)
SP GR SPEC: 1.01 — SIGNIFICANT CHANGE UP (ref 1.01–1.03)
T-CELL CD4 SUBSET PNL BLD: 1052 /UL — SIGNIFICANT CHANGE UP (ref 495–1312)
TOTAL CHOLESTEROL/HDL RATIO MEASUREMENT: 2 RATIO — LOW (ref 4–5.5)
TRIGL SERPL-MCNC: 70 MG/DL — SIGNIFICANT CHANGE UP (ref 10–149)
UROBILINOGEN FLD QL: 0.2 MG/DL — SIGNIFICANT CHANGE UP (ref 0.2–0.2)
WBC # BLD: 8.11 K/UL — SIGNIFICANT CHANGE UP (ref 4.8–10.8)
WBC # FLD AUTO: 8.11 K/UL — SIGNIFICANT CHANGE UP (ref 4.8–10.8)

## 2020-09-29 LAB
C TRACH RRNA SPEC QL NAA+PROBE: SIGNIFICANT CHANGE UP
HAV IGG SER QL IA: REACTIVE
HAV IGM SER-ACNC: SIGNIFICANT CHANGE UP
HBV CORE AB SER-ACNC: SIGNIFICANT CHANGE UP
HBV SURFACE AB SER-ACNC: SIGNIFICANT CHANGE UP
HBV SURFACE AG SER-ACNC: SIGNIFICANT CHANGE UP
N GONORRHOEA RRNA SPEC QL NAA+PROBE: SIGNIFICANT CHANGE UP
SPECIMEN SOURCE: SIGNIFICANT CHANGE UP
T PALLIDUM AB TITR SER: NEGATIVE — SIGNIFICANT CHANGE UP

## 2020-09-30 LAB
GAMMA INTERFERON BACKGROUND BLD IA-ACNC: 0.18 IU/ML — SIGNIFICANT CHANGE UP
M TB IFN-G BLD-IMP: NEGATIVE — SIGNIFICANT CHANGE UP
M TB IFN-G CD4+ BCKGRND COR BLD-ACNC: -0.02 IU/ML — SIGNIFICANT CHANGE UP
M TB IFN-G CD4+CD8+ BCKGRND COR BLD-ACNC: 0.08 IU/ML — SIGNIFICANT CHANGE UP
QUANT TB PLUS MITOGEN MINUS NIL: 8.71 IU/ML — SIGNIFICANT CHANGE UP

## 2020-10-02 LAB
HIV-1 VIRAL LOAD RESULT: SIGNIFICANT CHANGE UP
HIV1 RNA # SERPL NAA+PROBE: SIGNIFICANT CHANGE UP
HIV1 RNA SERPL NAA+PROBE-ACNC: SIGNIFICANT CHANGE UP
HIV1 RNA SERPL NAA+PROBE-LOG#: SIGNIFICANT CHANGE UP LG COP/ML

## 2020-10-20 ENCOUNTER — OUTPATIENT (OUTPATIENT)
Dept: OUTPATIENT SERVICES | Facility: HOSPITAL | Age: 45
LOS: 1 days | Discharge: HOME | End: 2020-10-20

## 2020-10-20 ENCOUNTER — RESULT REVIEW (OUTPATIENT)
Age: 45
End: 2020-10-20

## 2020-10-21 ENCOUNTER — RESULT REVIEW (OUTPATIENT)
Age: 45
End: 2020-10-21

## 2020-10-21 ENCOUNTER — OUTPATIENT (OUTPATIENT)
Dept: OUTPATIENT SERVICES | Facility: HOSPITAL | Age: 45
LOS: 1 days | Discharge: HOME | End: 2020-10-21

## 2020-10-21 ENCOUNTER — OUTPATIENT (OUTPATIENT)
Dept: OUTPATIENT SERVICES | Facility: HOSPITAL | Age: 45
LOS: 1 days | Discharge: HOME | End: 2020-10-21
Payer: COMMERCIAL

## 2020-10-21 PROCEDURE — 88112 CYTOPATH CELL ENHANCE TECH: CPT | Mod: 26

## 2020-10-22 DIAGNOSIS — B20 HUMAN IMMUNODEFICIENCY VIRUS [HIV] DISEASE: ICD-10-CM

## 2020-10-22 DIAGNOSIS — E78.5 HYPERLIPIDEMIA, UNSPECIFIED: ICD-10-CM

## 2020-10-22 DIAGNOSIS — I10 ESSENTIAL (PRIMARY) HYPERTENSION: ICD-10-CM

## 2020-10-22 DIAGNOSIS — Z23 ENCOUNTER FOR IMMUNIZATION: ICD-10-CM

## 2020-10-23 DIAGNOSIS — B97.7 PAPILLOMAVIRUS AS THE CAUSE OF DISEASES CLASSIFIED ELSEWHERE: ICD-10-CM

## 2020-10-23 DIAGNOSIS — Z23 ENCOUNTER FOR IMMUNIZATION: ICD-10-CM

## 2020-10-27 LAB — NON-GYNECOLOGICAL CYTOLOGY STUDY: SIGNIFICANT CHANGE UP

## 2020-10-28 LAB
CYTOLOGY SPEC DOC CYTO: SIGNIFICANT CHANGE UP
HPV HIGH+LOW RISK DNA PNL CVX: SIGNIFICANT CHANGE UP

## 2020-10-30 LAB — HPV DNA HIGH-RISK, ANAL/RECTAL: SIGNIFICANT CHANGE UP

## 2020-11-02 ENCOUNTER — EMERGENCY (EMERGENCY)
Facility: HOSPITAL | Age: 45
LOS: 0 days | Discharge: HOME | End: 2020-11-02
Attending: EMERGENCY MEDICINE | Admitting: EMERGENCY MEDICINE
Payer: COMMERCIAL

## 2020-11-02 VITALS
OXYGEN SATURATION: 98 % | SYSTOLIC BLOOD PRESSURE: 132 MMHG | RESPIRATION RATE: 18 BRPM | WEIGHT: 145.06 LBS | DIASTOLIC BLOOD PRESSURE: 81 MMHG | HEART RATE: 95 BPM | TEMPERATURE: 98 F

## 2020-11-02 VITALS
OXYGEN SATURATION: 97 % | DIASTOLIC BLOOD PRESSURE: 75 MMHG | SYSTOLIC BLOOD PRESSURE: 123 MMHG | RESPIRATION RATE: 18 BRPM | TEMPERATURE: 98 F | HEART RATE: 61 BPM

## 2020-11-02 DIAGNOSIS — R11.0 NAUSEA: ICD-10-CM

## 2020-11-02 DIAGNOSIS — R10.32 LEFT LOWER QUADRANT PAIN: ICD-10-CM

## 2020-11-02 DIAGNOSIS — B20 HUMAN IMMUNODEFICIENCY VIRUS [HIV] DISEASE: ICD-10-CM

## 2020-11-02 DIAGNOSIS — R10.9 UNSPECIFIED ABDOMINAL PAIN: ICD-10-CM

## 2020-11-02 LAB
ALBUMIN SERPL ELPH-MCNC: 4 G/DL — SIGNIFICANT CHANGE UP (ref 3.5–5.2)
ALP SERPL-CCNC: 62 U/L — SIGNIFICANT CHANGE UP (ref 30–115)
ALT FLD-CCNC: 15 U/L — SIGNIFICANT CHANGE UP (ref 0–41)
ANION GAP SERPL CALC-SCNC: 13 MMOL/L — SIGNIFICANT CHANGE UP (ref 7–14)
APPEARANCE UR: CLEAR — SIGNIFICANT CHANGE UP
AST SERPL-CCNC: 29 U/L — SIGNIFICANT CHANGE UP (ref 0–41)
BASOPHILS # BLD AUTO: 0.05 K/UL — SIGNIFICANT CHANGE UP (ref 0–0.2)
BASOPHILS NFR BLD AUTO: 0.6 % — SIGNIFICANT CHANGE UP (ref 0–1)
BILIRUB SERPL-MCNC: 0.4 MG/DL — SIGNIFICANT CHANGE UP (ref 0.2–1.2)
BILIRUB UR-MCNC: NEGATIVE — SIGNIFICANT CHANGE UP
BUN SERPL-MCNC: 7 MG/DL — LOW (ref 10–20)
CALCIUM SERPL-MCNC: 9.5 MG/DL — SIGNIFICANT CHANGE UP (ref 8.5–10.1)
CANCER AG125 SERPL-ACNC: 27 U/ML — SIGNIFICANT CHANGE UP
CHLORIDE SERPL-SCNC: 102 MMOL/L — SIGNIFICANT CHANGE UP (ref 98–110)
CO2 SERPL-SCNC: 18 MMOL/L — SIGNIFICANT CHANGE UP (ref 17–32)
COLOR SPEC: YELLOW — SIGNIFICANT CHANGE UP
CREAT SERPL-MCNC: 0.9 MG/DL — SIGNIFICANT CHANGE UP (ref 0.7–1.5)
DIFF PNL FLD: SIGNIFICANT CHANGE UP
EOSINOPHIL # BLD AUTO: 0.07 K/UL — SIGNIFICANT CHANGE UP (ref 0–0.7)
EOSINOPHIL NFR BLD AUTO: 0.8 % — SIGNIFICANT CHANGE UP (ref 0–8)
GLUCOSE SERPL-MCNC: 122 MG/DL — HIGH (ref 70–99)
GLUCOSE UR QL: NEGATIVE — SIGNIFICANT CHANGE UP
HCG SERPL-ACNC: <0.6 MIU/ML — SIGNIFICANT CHANGE UP
HCG UR QL: NEGATIVE — SIGNIFICANT CHANGE UP
HCT VFR BLD CALC: 36 % — LOW (ref 37–47)
HGB BLD-MCNC: 12.4 G/DL — SIGNIFICANT CHANGE UP (ref 12–16)
IMM GRANULOCYTES NFR BLD AUTO: 0.1 % — SIGNIFICANT CHANGE UP (ref 0.1–0.3)
KETONES UR-MCNC: ABNORMAL
LACTATE SERPL-SCNC: 1.6 MMOL/L — SIGNIFICANT CHANGE UP (ref 0.7–2)
LEUKOCYTE ESTERASE UR-ACNC: NEGATIVE — SIGNIFICANT CHANGE UP
LIDOCAIN IGE QN: 24 U/L — SIGNIFICANT CHANGE UP (ref 7–60)
LYMPHOCYTES # BLD AUTO: 3.28 K/UL — SIGNIFICANT CHANGE UP (ref 1.2–3.4)
LYMPHOCYTES # BLD AUTO: 36.4 % — SIGNIFICANT CHANGE UP (ref 20.5–51.1)
MCHC RBC-ENTMCNC: 30.9 PG — SIGNIFICANT CHANGE UP (ref 27–31)
MCHC RBC-ENTMCNC: 34.4 G/DL — SIGNIFICANT CHANGE UP (ref 32–37)
MCV RBC AUTO: 89.8 FL — SIGNIFICANT CHANGE UP (ref 81–99)
MONOCYTES # BLD AUTO: 0.6 K/UL — SIGNIFICANT CHANGE UP (ref 0.1–0.6)
MONOCYTES NFR BLD AUTO: 6.7 % — SIGNIFICANT CHANGE UP (ref 1.7–9.3)
NEUTROPHILS # BLD AUTO: 5.01 K/UL — SIGNIFICANT CHANGE UP (ref 1.4–6.5)
NEUTROPHILS NFR BLD AUTO: 55.4 % — SIGNIFICANT CHANGE UP (ref 42.2–75.2)
NITRITE UR-MCNC: NEGATIVE — SIGNIFICANT CHANGE UP
NRBC # BLD: 0 /100 WBCS — SIGNIFICANT CHANGE UP (ref 0–0)
PH UR: 7 — SIGNIFICANT CHANGE UP (ref 5–8)
PLATELET # BLD AUTO: 201 K/UL — SIGNIFICANT CHANGE UP (ref 130–400)
POTASSIUM SERPL-MCNC: 3.8 MMOL/L — SIGNIFICANT CHANGE UP (ref 3.5–5)
POTASSIUM SERPL-SCNC: 3.8 MMOL/L — SIGNIFICANT CHANGE UP (ref 3.5–5)
PROT SERPL-MCNC: 6.4 G/DL — SIGNIFICANT CHANGE UP (ref 6–8)
PROT UR-MCNC: SIGNIFICANT CHANGE UP
RBC # BLD: 4.01 M/UL — LOW (ref 4.2–5.4)
RBC # FLD: 12.2 % — SIGNIFICANT CHANGE UP (ref 11.5–14.5)
SODIUM SERPL-SCNC: 133 MMOL/L — LOW (ref 135–146)
SP GR SPEC: 1.01 — SIGNIFICANT CHANGE UP (ref 1.01–1.03)
UROBILINOGEN FLD QL: SIGNIFICANT CHANGE UP
WBC # BLD: 9.02 K/UL — SIGNIFICANT CHANGE UP (ref 4.8–10.8)
WBC # FLD AUTO: 9.02 K/UL — SIGNIFICANT CHANGE UP (ref 4.8–10.8)

## 2020-11-02 PROCEDURE — 99053 MED SERV 10PM-8AM 24 HR FAC: CPT

## 2020-11-02 PROCEDURE — 74177 CT ABD & PELVIS W/CONTRAST: CPT | Mod: 26

## 2020-11-02 PROCEDURE — 99282 EMERGENCY DEPT VISIT SF MDM: CPT

## 2020-11-02 PROCEDURE — 76830 TRANSVAGINAL US NON-OB: CPT | Mod: 26

## 2020-11-02 PROCEDURE — 99285 EMERGENCY DEPT VISIT HI MDM: CPT

## 2020-11-02 RX ORDER — IBUPROFEN 200 MG
1 TABLET ORAL
Qty: 30 | Refills: 0
Start: 2020-11-02

## 2020-11-02 RX ORDER — OXYCODONE AND ACETAMINOPHEN 5; 325 MG/1; MG/1
1 TABLET ORAL
Qty: 12 | Refills: 0
Start: 2020-11-02

## 2020-11-02 RX ORDER — SODIUM CHLORIDE 9 MG/ML
1000 INJECTION INTRAMUSCULAR; INTRAVENOUS; SUBCUTANEOUS ONCE
Refills: 0 | Status: COMPLETED | OUTPATIENT
Start: 2020-11-02 | End: 2020-11-02

## 2020-11-02 RX ORDER — SODIUM CHLORIDE 9 MG/ML
1000 INJECTION, SOLUTION INTRAVENOUS
Refills: 0 | Status: DISCONTINUED | OUTPATIENT
Start: 2020-11-02 | End: 2020-11-02

## 2020-11-02 RX ORDER — MORPHINE SULFATE 50 MG/1
4 CAPSULE, EXTENDED RELEASE ORAL ONCE
Refills: 0 | Status: DISCONTINUED | OUTPATIENT
Start: 2020-11-02 | End: 2020-11-02

## 2020-11-02 RX ORDER — KETOROLAC TROMETHAMINE 30 MG/ML
30 SYRINGE (ML) INJECTION ONCE
Refills: 0 | Status: DISCONTINUED | OUTPATIENT
Start: 2020-11-02 | End: 2020-11-02

## 2020-11-02 RX ADMIN — MORPHINE SULFATE 4 MILLIGRAM(S): 50 CAPSULE, EXTENDED RELEASE ORAL at 09:42

## 2020-11-02 RX ADMIN — SODIUM CHLORIDE 1000 MILLILITER(S): 9 INJECTION INTRAMUSCULAR; INTRAVENOUS; SUBCUTANEOUS at 03:28

## 2020-11-02 RX ADMIN — SODIUM CHLORIDE 100 MILLILITER(S): 9 INJECTION, SOLUTION INTRAVENOUS at 06:43

## 2020-11-02 RX ADMIN — Medication 30 MILLIGRAM(S): at 03:42

## 2020-11-02 RX ADMIN — Medication 30 MILLIGRAM(S): at 04:31

## 2020-11-02 RX ADMIN — MORPHINE SULFATE 4 MILLIGRAM(S): 50 CAPSULE, EXTENDED RELEASE ORAL at 07:02

## 2020-11-02 RX ADMIN — MORPHINE SULFATE 4 MILLIGRAM(S): 50 CAPSULE, EXTENDED RELEASE ORAL at 04:31

## 2020-11-02 RX ADMIN — MORPHINE SULFATE 4 MILLIGRAM(S): 50 CAPSULE, EXTENDED RELEASE ORAL at 05:58

## 2020-11-02 RX ADMIN — SODIUM CHLORIDE 1000 MILLILITER(S): 9 INJECTION INTRAMUSCULAR; INTRAVENOUS; SUBCUTANEOUS at 04:31

## 2020-11-02 NOTE — ED PROVIDER NOTE - PHYSICAL EXAMINATION
CONSTITUTIONAL: Well-developed; well-nourished; in no acute distress, nontoxic appearing  SKIN: skin exam is warm and dry,  HEAD: Normocephalic; atraumatic.  EYES: PERRL, 3 mm bilateral, no nystagmus, EOM intact; conjunctiva and sclera clear.  ENT: MMM, no nasal congestion  NECK: Supple; non tender.  ROM intact.  CARD: S1, S2 normal, no murmur  RESP: No wheezes, rales or rhonchi. Good air movement bilaterally  ABD: Tender in llq only, no rebound or guarding.   EXT: Normal ROM. No cyanosis or edema. Dp Pulses intact.   NEURO: awake, alert, following commands, oriented, grossly unremarkable. No Focal deficits. GCS 15.   PSYCH: Cooperative, appropriate.

## 2020-11-02 NOTE — CONSULT NOTE ADULT - SUBJECTIVE AND OBJECTIVE BOX
46yo  with LMP 10/10, HIV pos, presents to ED with complaints of sudden onset of sharp LLQ pain 24 hrs ago, radiates to left lower back, 9/10 intensity. Reports associated nausea/vomiting x 2 episodes, and chillls. Patient had a similar episode 1 week ago of 2 days duration, it was relieved after taking Aleve. Denies fever, night sweats, unintended weight loss, early satiety. Also denies abnormal vaginal discharge, change in bowel habits, dysuria.      Ob/Gyn History:  , FT  x 2, ETOP with D&C x 1                 LMP -10/10                   Cycle Length - 30days   HIV pos, h/o Trich s/p tx, h/o pos HR HPV. Denies history of known ovarian cysts, uterine fibroids  Last papsmear:  NILM, HPV neg  Last mammogram: 10/2019 [facility] 1   Last colonoscopy: Never    [Other routine screening tests:  * GYN pap: NILM, Anal Pap ASCUS  * GYN pap: NILM, Anal Pap: ASCUS, excision of anal lesions: chronic inflammation  * GYN pap: NILM, Perianal lesion ablation: polypoid colonic mucosa  * GYN pap: NILM, Anal Pap: LGSIL  * Anal pap: NILM  * GYN pap: ASCUS, Anal Pap: ASCUS]    Denies the following: constitutional symptoms, visual symptoms, cardiovascular symptoms, respiratory symptoms, GI symptoms, musculoskeletal symptoms, skin symptoms, neurologic symptoms, hematologic symptoms, allergic symptoms, psychiatric symptoms. Except any pertinent positives listed.     Home Medications: HIV meds (patient is unsure of names), Seroquel, Cogentin,     Allergies: Haldol (Short breath; Dystonic RXN)    PAST MEDICAL & SURGICAL HISTORY:  Bipolar disease, chronic  HIV disease  H/o BTL   H/o LEEP    FAMILY HISTORY: Unsure of family history    SOCIAL HISTORY: Former smoker 1/2 ppd x 35 years, quit 5 months ago. Uses marijuana for medical use. Denies current cigarette use, alcohol use, or other illicit drug use  Vital Signs Last 24 Hrs  T(F): 98.3 (2020 02:46), Max: 98.3 (2020 02:46)  HR: 95 (2020 02:46) (95 - 95)  BP: 132/81 (2020 02:46) (132/81 - 132/81)  RR: 18 (2020 02:46) (18 - 18)    General Appearance - AAOx3, NAD  Heart - S1S2 regular rate and rhythm  Lung - CTA Bilaterally  Abdomen - Soft, mild LLQ tenderness, nondistended, no rebound, no rigidity, no guarding, bowel sounds present  External genitalia - normal, no masses/lesions  Vagina - normal, no blood, no masses/lesions  Cervix - normal, physiological discharge, no CMT  Uterus - normal, no tenderness, mobile  Adnexa - mobile left adnexal mass, mildly tender Left>>>right      LABS:                        12.4   9.02  )-----------( 201      ( 2020 03:06 )             36.0     HCG Quantitative, Serum: <0.6 mIU/mL (20 @ 03:06)          133<L>  |  102  |  7<L>  ----------------------------<  122<H>  3.8   |  18  |  0.9    Ca    9.5      2020 03:06    TPro  6.4  /  Alb  4.0  /  TBili  0.4  /  DBili  x   /  AST  29  /  ALT  15  /  AlkPhos  62        Urinalysis Basic - ( 2020 03:30 )    Color: Yellow / Appearance: Clear / S.015 / pH: x  Gluc: x / Ketone: Moderate  / Bili: Negative / Urobili: <2 mg/dL   Blood: x / Protein: Trace / Nitrite: Negative   Leuk Esterase: Negative / RBC: x / WBC x   Sq Epi: x / Non Sq Epi: x / Bacteria: x          RADIOLOGY & ADDITIONAL STUDIES:  < from: CT Abdomen and Pelvis w/ IV Cont (20 @ 04:35) >  EXAM:  CT ABDOMEN AND PELVIS IC        PROCEDURE DATE:  2020    INTERPRETATION:  CLINICAL STATEMENT: Abdominal pain.  TECHNIQUE: Contiguous axial CT images were obtained from the lower chest to the pubic symphysis following administration of 100cc Optiray 320 intravenous contrast.  Oral contrast was not administered.  Reformatted images in the coronal and sagittal planes were acquired.  COMPARISON: None.  FINDINGS:  LOWER CHEST: Unremarkable.  HEPATOBILIARY: Hepatic dome calcified granuloma.  SPLEEN: Unremarkable.  PANCREAS: Unremarkable.  ADRENAL GLANDS: Unremarkable.  KIDNEYS: Symmetric renal enhancement. No hydronephrosis. Punctate nonobstructive left upper renal pole calculus.  ABDOMINOPELVIC NODES: Unremarkable.  PELVIC ORGANS: Indeterminate left pelvic, paraovarian 7.8 cm cyst (series 5, image 343). Right ovary unremarkable on CT.  PERITONEUM/MESENTERY/BOWEL: No evidence of bowel obstruction, pneumoperitoneum. Trace free pelvic fluid; nonspecific. Appendix is unremarkable.  BONES/SOFT TISSUES: Unremarkable.  IMPRESSION:  Indeterminate left pelvic, paraovarian 7.8 cm cyst. Pelvic ultrasound and eventual nonemergent MRI pelvis with and without IV contrast recommended given size.   of copied text >    < from: US Transvaginal (20 @ 06:25) >  ******PRELIMINARY REPORT******    ******PRELIMINARY REPORT******        EXAM:  US TRANSVAGINAL        PROCEDURE DATE:  2020    ******PRELIMINARY REPORT******    ******PRELIMINARY REPORT******        INTERPRETATION:  CLINICAL HISTORY: Pain. Clinical suspicion of torsion.  COMPARISON/CORRELATION: CT abdomen pelvis 2020. Pelvic ultrasound 2007.  PROCEDURE: Transvaginal ultrasound of the pelvis was performed, including Doppler.  LMP: 10/10/2020  FINDINGS:  UTERUS: The uterus measures 9.3 x 5.8 x 5.3 cm, with normal echogenicity and morphology. The endometrial echo complex measures 1.1 cm, which is normal in thickness.  RIGHT OVARY: Measures 2.7 x 1.7 x 1.3 cm, and is unremarkable. Doppler flow is demonstrated to the right ovary.  LEFT OVARY: Measures 6.0 x 6.0 x 5.9 cm, and contains a 5.7 cm cyst. Doppler flow is demonstrated to the left ovary.  OTHER: Trace free fluid in the pelvis.  IMPRESSION:  1. 5.7 cm left para-ovarian cyst.  < end of copied text >   44yo  with LMP 10/10, HIV pos, presents to ED with complaints of sudden onset of sharp LLQ pain 24 hrs ago, radiates to left lower back, 9/10 intensity. Currently reports pain is improved and more of dull and throbbing s/p morphine x2 and toradol x1 (last @0558). Reports associated nausea/vomiting x 2 episodes, and chillls. Patient had a similar episode 1 week ago of 2 days duration, it was relieved after taking Aleve. Denies fever, night sweats, unintended weight loss, early satiety. Reports constipation with pebble-like stool.s Denies abnormal vaginal discharge, dysuria.      Ob/Gyn History:  , FT  x 2, ETOP with D&C x 1                 LMP -10/10                   Cycle Length - 30days   HIV pos, h/o Trich s/p tx, h/o pos HR HPV. Denies history of known ovarian cysts, uterine fibroids  Last papsmear:  NILM, HPV neg  Last mammogram: 10/2019 [facility]ads 1   Last colonoscopy: Never    [Other routine screening tests:  *2017 GYN pap: NILM, Anal Pap ASCUS  * GYN pap: NILM, Anal Pap: ASCUS, excision of anal lesions: chronic inflammation  * GYN pap: NILM, Perianal lesion ablation: polypoid colonic mucosa  * GYN pap: NILM, Anal Pap: LGSIL  * Anal pap: NILM  * GYN pap: ASCUS, Anal Pap: ASCUS]    Denies the following: constitutional symptoms, visual symptoms, cardiovascular symptoms, respiratory symptoms, GI symptoms, musculoskeletal symptoms, skin symptoms, neurologic symptoms, hematologic symptoms, allergic symptoms, psychiatric symptoms. Except any pertinent positives listed.     Home Medications: HIV meds (patient is unsure of names), Seroquel, Cogentin,     Allergies: Haldol (Short breath; Dystonic RXN)    PAST MEDICAL & SURGICAL HISTORY:  Bipolar disease, chronic  HIV disease  H/o BTL   H/o LEEP    FAMILY HISTORY: Unsure of family history    SOCIAL HISTORY: Former smoker 1/2 ppd x 35 years, quit 5 months ago. Uses marijuana for medical use. Denies current cigarette use, alcohol use, or other illicit drug use  Vital Signs Last 24 Hrs  T(F): 98.3 (2020 02:46), Max: 98.3 (2020 02:46)  HR: 95 (2020 02:46) (95 - 95)  BP: 132/81 (2020 02:46) (132/81 - 132/81)  RR: 18 (2020 02:46) (18 - 18)    General Appearance - AAOx3, NAD  Heart - S1S2 regular rate and rhythm  Lung - CTA Bilaterally  Abdomen - Soft, mild LLQ tenderness, nondistended, no rebound, no rigidity, no guarding, bowel sounds present  External genitalia - normal, no masses/lesions  Vagina - normal, no blood, no masses/lesions  Cervix - normal, physiological discharge, no CMT  Uterus - normal, no tenderness, mobile  Adnexa - mobile left adnexal mass, mildly tender Left>>>right      LABS:                        12.4   9.02  )-----------( 201      ( 2020 03:06 )             36.0     HCG Quantitative, Serum: <0.6 mIU/mL (20 @ 03:06)          133<L>  |  102  |  7<L>  ----------------------------<  122<H>  3.8   |  18  |  0.9    Ca    9.5      2020 03:06    TPro  6.4  /  Alb  4.0  /  TBili  0.4  /  DBili  x   /  AST  29  /  ALT  15  /  AlkPhos  62        Urinalysis Basic - ( 2020 03:30 )    Color: Yellow / Appearance: Clear / S.015 / pH: x  Gluc: x / Ketone: Moderate  / Bili: Negative / Urobili: <2 mg/dL   Blood: x / Protein: Trace / Nitrite: Negative   Leuk Esterase: Negative / RBC: x / WBC x   Sq Epi: x / Non Sq Epi: x / Bacteria: x          RADIOLOGY & ADDITIONAL STUDIES:  < from: CT Abdomen and Pelvis w/ IV Cont (20 @ 04:35) >  EXAM:  CT ABDOMEN AND PELVIS IC        PROCEDURE DATE:  2020    INTERPRETATION:  CLINICAL STATEMENT: Abdominal pain.  TECHNIQUE: Contiguous axial CT images were obtained from the lower chest to the pubic symphysis following administration of 100cc Optiray 320 intravenous contrast.  Oral contrast was not administered.  Reformatted images in the coronal and sagittal planes were acquired.  COMPARISON: None.  FINDINGS:  LOWER CHEST: Unremarkable.  HEPATOBILIARY: Hepatic dome calcified granuloma.  SPLEEN: Unremarkable.  PANCREAS: Unremarkable.  ADRENAL GLANDS: Unremarkable.  KIDNEYS: Symmetric renal enhancement. No hydronephrosis. Punctate nonobstructive left upper renal pole calculus.  ABDOMINOPELVIC NODES: Unremarkable.  PELVIC ORGANS: Indeterminate left pelvic, paraovarian 7.8 cm cyst (series 5, image 343). Right ovary unremarkable on CT.  PERITONEUM/MESENTERY/BOWEL: No evidence of bowel obstruction, pneumoperitoneum. Trace free pelvic fluid; nonspecific. Appendix is unremarkable.  BONES/SOFT TISSUES: Unremarkable.  IMPRESSION:  Indeterminate left pelvic, paraovarian 7.8 cm cyst. Pelvic ultrasound and eventual nonemergent MRI pelvis with and without IV contrast recommended given size.   of copied text >    < from: US Transvaginal (20 @ 06:25) >  ******PRELIMINARY REPORT******    ******PRELIMINARY REPORT******        EXAM:  US TRANSVAGINAL        PROCEDURE DATE:  2020    ******PRELIMINARY REPORT******    ******PRELIMINARY REPORT******        INTERPRETATION:  CLINICAL HISTORY: Pain. Clinical suspicion of torsion.  COMPARISON/CORRELATION: CT abdomen pelvis 2020. Pelvic ultrasound 2007.  PROCEDURE: Transvaginal ultrasound of the pelvis was performed, including Doppler.  LMP: 10/10/2020  FINDINGS:  UTERUS: The uterus measures 9.3 x 5.8 x 5.3 cm, with normal echogenicity and morphology. The endometrial echo complex measures 1.1 cm, which is normal in thickness.  RIGHT OVARY: Measures 2.7 x 1.7 x 1.3 cm, and is unremarkable. Doppler flow is demonstrated to the right ovary.  LEFT OVARY: Measures 6.0 x 6.0 x 5.9 cm, and contains a 5.7 cm cyst. Doppler flow is demonstrated to the left ovary.  OTHER: Trace free fluid in the pelvis.  IMPRESSION:  1. 5.7 cm left para-ovarian cyst.  < end of copied text >

## 2020-11-02 NOTE — ED PROVIDER NOTE - CLINICAL SUMMARY MEDICAL DECISION MAKING FREE TEXT BOX
s/o to me. 44 y/o female HIV+, presented for LLQ pain. CT and U/S show a mass adjacent to the left ovary. No torsion. Seen by GYN. Will control pain. Patient will f/u with GYN in one week.

## 2020-11-02 NOTE — CONSULT NOTE ADULT - ASSESSMENT
46yo  with LMP 10/10, HIV pos, with LLQ pain, large left paraovarian cyst, unlikely torsed, currently clinically and hemodynamically stable    -- pending final US read 46yo  with LMP 10/10, HIV pos, with LLQ pain likely secondary to constipation, simple appearing left paraovarian cyst, unlikely to be torsed, currently clinically and hemodynamically stable    - no acute GYN intervention   - f/u with GYN as outpatient with repeat TVUS in 4-6 weeks   - f/u    - torsion precautions  - enema for constipation; tylenol/motrin PRN    - dispo per ED     Dr. Maria aware 44yo  with LMP 10/10, HIV pos, with LLQ pain likely secondary to constipation, simple appearing left paraovarian cyst, unlikely to be torsed, currently clinically and hemodynamically stable    - no acute GYN intervention   - f/u with GYN as outpatient in 1-2 weeks with repeat TVUS in 4-6 weeks   - f/u    - torsion precautions  - enema for constipation; tylenol/motrin PRN    - dispo per ED     Dr. aMria aware

## 2020-11-02 NOTE — ED PROVIDER NOTE - CARE PLAN
Principal Discharge DX:	Abdominal pain  Assessment and plan of treatment:	patient with llq abdominal pain, differential includes diverticulitis vs pelvic pathology, will require imaging, labs, and pain control.

## 2020-11-02 NOTE — ED PROVIDER NOTE - PATIENT PORTAL LINK FT
You can access the FollowMyHealth Patient Portal offered by [facility] by registering at the following website: [email or web address]. By joining [facility]’s FollowMyHealth portal, you will also be able to view your health information using other applications (apps) compatible with our system.

## 2020-11-02 NOTE — ED PROVIDER NOTE - OBJECTIVE STATEMENT
1 day of llq pain with radiation to left flank intermittent but worsening associated with nausea no urinary symptoms and no vomiting or fevers. intensity is moderate.

## 2020-11-02 NOTE — ED PROVIDER NOTE - NSFOLLOWUPCLINICS_GEN_ALL_ED_FT
[facility]  OB/GYN  [address] [place] [address]  Phone: [phone]  Fax:   Follow Up Time: 7-10 Days

## 2020-11-02 NOTE — ED ADULT NURSE NOTE - OBJECTIVE STATEMENT
pt complaining of LLQ abdominal pain. Denies any vomiting/diarrhea. 6/10 pain. Pt reports pain started a few weeks ago, went away, and came back today.

## 2020-11-02 NOTE — ED PROVIDER NOTE - PLAN OF CARE
patient with llq abdominal pain, differential includes diverticulitis vs pelvic pathology, will require imaging, labs, and pain control.

## 2020-11-02 NOTE — ED PROVIDER NOTE - NS ED ROS FT
Constitutional:  no fevers, no chills, no malaise  Eyes:  No visual changes  ENMT: No neck pain or stiffness, no nasal congestion, no ear pain, no throat pain  Cardiac:  No chest pain  Respiratory:  No cough or sob  GI: +abd pain, +nausea  :  No dysuria, frequency or burning.  MS:  No back pain, no joint pain.  Neuro:  No headache, no dizziness, no change in mental status  Skin:  No skin rash  Except as documented in the HPI,  all other systems are negative

## 2020-11-13 ENCOUNTER — OUTPATIENT (OUTPATIENT)
Dept: OUTPATIENT SERVICES | Facility: HOSPITAL | Age: 45
LOS: 1 days | Discharge: HOME | End: 2020-11-13

## 2020-11-13 ENCOUNTER — APPOINTMENT (OUTPATIENT)
Dept: OBGYN | Facility: CLINIC | Age: 45
End: 2020-11-13
Payer: COMMERCIAL

## 2020-11-13 VITALS
DIASTOLIC BLOOD PRESSURE: 80 MMHG | SYSTOLIC BLOOD PRESSURE: 100 MMHG | HEIGHT: 67 IN | BODY MASS INDEX: 21.97 KG/M2 | WEIGHT: 140 LBS

## 2020-11-13 PROBLEM — F31.9 BIPOLAR DISORDER, UNSPECIFIED: Chronic | Status: ACTIVE | Noted: 2020-11-02

## 2020-11-13 PROBLEM — B20 HUMAN IMMUNODEFICIENCY VIRUS [HIV] DISEASE: Chronic | Status: ACTIVE | Noted: 2020-11-02

## 2020-11-13 PROCEDURE — 99214 OFFICE O/P EST MOD 30 MIN: CPT

## 2020-11-13 NOTE — HISTORY OF PRESENT ILLNESS
[Regular Cycle Intervals] : periods have been regular [PapSmeardate] : 10/2020 [TextBox_53] : positive [FreeTextEntry1] : 11/13/2020

## 2020-11-13 NOTE — DISCUSSION/SUMMARY
[FreeTextEntry1] : A/P: 46yo  with LMP , HIV pos; with 5cm paraovarian simple cyst with normal CA-125; currently asymptomatic \par -discussed tyenol and motrin use for menstrual cramps\par -repeat TVUS \par -discussed torsion precautions \par -RTC in one month to discuss results of repeat U/S

## 2020-11-19 ENCOUNTER — OUTPATIENT (OUTPATIENT)
Dept: OUTPATIENT SERVICES | Facility: HOSPITAL | Age: 45
LOS: 1 days | Discharge: HOME | End: 2020-11-19
Payer: COMMERCIAL

## 2020-11-19 ENCOUNTER — OUTPATIENT (OUTPATIENT)
Dept: OUTPATIENT SERVICES | Facility: HOSPITAL | Age: 45
LOS: 1 days | Discharge: HOME | End: 2020-11-19

## 2020-11-19 DIAGNOSIS — B97.7 PAPILLOMAVIRUS AS THE CAUSE OF DISEASES CLASSIFIED ELSEWHERE: ICD-10-CM

## 2020-11-19 DIAGNOSIS — E78.9 DISORDER OF LIPOPROTEIN METABOLISM, UNSPECIFIED: ICD-10-CM

## 2020-11-19 DIAGNOSIS — B20 HUMAN IMMUNODEFICIENCY VIRUS [HIV] DISEASE: ICD-10-CM

## 2020-11-19 PROCEDURE — 99423 OL DIG E/M SVC 21+ MIN: CPT

## 2020-12-04 ENCOUNTER — OUTPATIENT (OUTPATIENT)
Dept: OUTPATIENT SERVICES | Facility: HOSPITAL | Age: 45
LOS: 1 days | Discharge: HOME | End: 2020-12-04
Payer: COMMERCIAL

## 2020-12-04 DIAGNOSIS — Z12.31 ENCOUNTER FOR SCREENING MAMMOGRAM FOR MALIGNANT NEOPLASM OF BREAST: ICD-10-CM

## 2020-12-04 PROCEDURE — 77063 BREAST TOMOSYNTHESIS BI: CPT | Mod: 26

## 2020-12-04 PROCEDURE — 77067 SCR MAMMO BI INCL CAD: CPT | Mod: 26

## 2020-12-11 ENCOUNTER — OUTPATIENT (OUTPATIENT)
Dept: OUTPATIENT SERVICES | Facility: HOSPITAL | Age: 45
LOS: 1 days | Discharge: HOME | End: 2020-12-11

## 2020-12-11 ENCOUNTER — APPOINTMENT (OUTPATIENT)
Dept: ANTEPARTUM | Facility: CLINIC | Age: 45
End: 2020-12-11
Payer: COMMERCIAL

## 2020-12-11 ENCOUNTER — ASOB RESULT (OUTPATIENT)
Age: 45
End: 2020-12-11

## 2020-12-11 PROCEDURE — 76830 TRANSVAGINAL US NON-OB: CPT | Mod: 26

## 2020-12-18 ENCOUNTER — APPOINTMENT (OUTPATIENT)
Dept: OBGYN | Facility: CLINIC | Age: 45
End: 2020-12-18
Payer: COMMERCIAL

## 2020-12-18 ENCOUNTER — OUTPATIENT (OUTPATIENT)
Dept: OUTPATIENT SERVICES | Facility: HOSPITAL | Age: 45
LOS: 1 days | Discharge: HOME | End: 2020-12-18

## 2020-12-18 VITALS
SYSTOLIC BLOOD PRESSURE: 110 MMHG | HEIGHT: 67 IN | BODY MASS INDEX: 22.76 KG/M2 | DIASTOLIC BLOOD PRESSURE: 80 MMHG | WEIGHT: 145 LBS

## 2020-12-18 DIAGNOSIS — Z21 ASYMPTOMATIC HUMAN IMMUNODEFICIENCY VIRUS [HIV] INFECTION STATUS: ICD-10-CM

## 2020-12-18 PROCEDURE — 99213 OFFICE O/P EST LOW 20 MIN: CPT

## 2020-12-19 PROBLEM — Z21 HIV POSITIVE: Status: RESOLVED | Noted: 2020-12-19 | Resolved: 2020-12-19

## 2020-12-19 NOTE — END OF VISIT
[] : Resident [FreeTextEntry3] : Agree with resident note above\par Pt offered surgical vs expectant management, discussed likely benign but cannot be certain without removal.  Discussed risks of torsion. Pt prefer to follow w/ repeat ultrasound and ca125 in 6 months. [Time Spent: ___ minutes] : I have spent [unfilled] minutes of time on the encounter. [>50% of the face to face encounter time was spent on counseling and/or coordination of care for ___] : Greater than 50% of the face to face encounter time was spent on counseling and/or coordination of care for [unfilled]

## 2020-12-19 NOTE — DISCUSSION/SUMMARY
[FreeTextEntry1] : 46 yo P2, w/ 5cm left paraovarian cyst, asymptomatic and doing well.\par \par - Discussed that as CA-125 was negative and imaging demonstrated cyst as simple and unchanged in size, cyst is likely benign. Offered expectant vs surgical management for definitive management, rule out malignancy, prevent torsion, severe pain, need for emergency surgery. Patient desiring expectant management. \par - RTC 6 months, repeat TVUS and CA-125 prior to appointment\par - torsion precautions given\par - will continue routine care with her primary GYN Dr. D'Amico

## 2020-12-19 NOTE — HISTORY OF PRESENT ILLNESS
[FreeTextEntry1] : 44 yo P2 w/ LMP 12/11/20 presents for GYN follow up. Patient was seen in ED beginning of November for 5cm paraovarian cyst. Since then pain has completely resolved. She repeated TVUS 12/11, which demonstrated unchanged cyst. currently asymptomatic, no pain.

## 2021-01-14 ENCOUNTER — OUTPATIENT (OUTPATIENT)
Dept: OUTPATIENT SERVICES | Facility: HOSPITAL | Age: 46
LOS: 1 days | Discharge: HOME | End: 2021-01-14

## 2021-01-21 ENCOUNTER — OUTPATIENT (OUTPATIENT)
Dept: OUTPATIENT SERVICES | Facility: HOSPITAL | Age: 46
LOS: 1 days | Discharge: HOME | End: 2021-01-21
Payer: COMMERCIAL

## 2021-01-21 DIAGNOSIS — E78.5 HYPERLIPIDEMIA, UNSPECIFIED: ICD-10-CM

## 2021-01-21 DIAGNOSIS — B20 HUMAN IMMUNODEFICIENCY VIRUS [HIV] DISEASE: ICD-10-CM

## 2021-01-21 PROCEDURE — 99423 OL DIG E/M SVC 21+ MIN: CPT

## 2021-02-04 ENCOUNTER — OUTPATIENT (OUTPATIENT)
Dept: OUTPATIENT SERVICES | Facility: HOSPITAL | Age: 46
LOS: 1 days | Discharge: HOME | End: 2021-02-04
Payer: COMMERCIAL

## 2021-02-04 DIAGNOSIS — B20 HUMAN IMMUNODEFICIENCY VIRUS [HIV] DISEASE: ICD-10-CM

## 2021-02-04 DIAGNOSIS — E78.5 HYPERLIPIDEMIA, UNSPECIFIED: ICD-10-CM

## 2021-02-04 PROCEDURE — 99423 OL DIG E/M SVC 21+ MIN: CPT

## 2021-02-08 ENCOUNTER — APPOINTMENT (OUTPATIENT)
Dept: NEUROSURGERY | Facility: CLINIC | Age: 46
End: 2021-02-08
Payer: COMMERCIAL

## 2021-02-08 VITALS — HEIGHT: 67 IN | BODY MASS INDEX: 22.76 KG/M2 | WEIGHT: 145 LBS

## 2021-02-08 LAB
A1C WITH ESTIMATED AVERAGE GLUCOSE RESULT: 5.5 % — SIGNIFICANT CHANGE UP (ref 4–5.6)
ALBUMIN SERPL ELPH-MCNC: 4.4 G/DL — SIGNIFICANT CHANGE UP (ref 3.5–5.2)
ALP SERPL-CCNC: 79 U/L — SIGNIFICANT CHANGE UP (ref 30–115)
ALT FLD-CCNC: 13 U/L — SIGNIFICANT CHANGE UP (ref 0–41)
ANION GAP SERPL CALC-SCNC: 14 MMOL/L — SIGNIFICANT CHANGE UP (ref 7–14)
AST SERPL-CCNC: 17 U/L — SIGNIFICANT CHANGE UP (ref 0–41)
BILIRUB SERPL-MCNC: 0.3 MG/DL — SIGNIFICANT CHANGE UP (ref 0.2–1.2)
BUN SERPL-MCNC: 9 MG/DL — LOW (ref 10–20)
CALCIUM SERPL-MCNC: 9.6 MG/DL — SIGNIFICANT CHANGE UP (ref 8.5–10.1)
CHLORIDE SERPL-SCNC: 104 MMOL/L — SIGNIFICANT CHANGE UP (ref 98–110)
CHOLEST SERPL-MCNC: 120 MG/DL — SIGNIFICANT CHANGE UP
CO2 SERPL-SCNC: 20 MMOL/L — SIGNIFICANT CHANGE UP (ref 17–32)
CREAT SERPL-MCNC: 1 MG/DL — SIGNIFICANT CHANGE UP (ref 0.7–1.5)
ESTIMATED AVERAGE GLUCOSE: 111 MG/DL — SIGNIFICANT CHANGE UP (ref 68–114)
GGT SERPL-CCNC: 22 U/L — SIGNIFICANT CHANGE UP (ref 1–40)
GLUCOSE SERPL-MCNC: 95 MG/DL — SIGNIFICANT CHANGE UP (ref 70–99)
HCT VFR BLD CALC: 37.8 % — SIGNIFICANT CHANGE UP (ref 37–47)
HCV AB S/CO SERPL IA: 0.03 COI — SIGNIFICANT CHANGE UP
HCV AB SERPL-IMP: SIGNIFICANT CHANGE UP
HDLC SERPL-MCNC: 60 MG/DL — SIGNIFICANT CHANGE UP
HGB BLD-MCNC: 12.7 G/DL — SIGNIFICANT CHANGE UP (ref 12–16)
LDH SERPL L TO P-CCNC: 156 — SIGNIFICANT CHANGE UP (ref 50–242)
LIPID PNL WITH DIRECT LDL SERPL: 47 MG/DL — SIGNIFICANT CHANGE UP
MCHC RBC-ENTMCNC: 30.6 PG — SIGNIFICANT CHANGE UP (ref 27–31)
MCHC RBC-ENTMCNC: 33.6 G/DL — SIGNIFICANT CHANGE UP (ref 32–37)
MCV RBC AUTO: 91.1 FL — SIGNIFICANT CHANGE UP (ref 81–99)
NON HDL CHOLESTEROL: 60 MG/DL — SIGNIFICANT CHANGE UP
NRBC # BLD: 0 /100 WBCS — SIGNIFICANT CHANGE UP (ref 0–0)
PLATELET # BLD AUTO: 307 K/UL — SIGNIFICANT CHANGE UP (ref 130–400)
POTASSIUM SERPL-MCNC: 4.5 MMOL/L — SIGNIFICANT CHANGE UP (ref 3.5–5)
POTASSIUM SERPL-SCNC: 4.5 MMOL/L — SIGNIFICANT CHANGE UP (ref 3.5–5)
PROT SERPL-MCNC: 7.1 G/DL — SIGNIFICANT CHANGE UP (ref 6–8)
RBC # BLD: 4.15 M/UL — LOW (ref 4.2–5.4)
RBC # FLD: 12.9 % — SIGNIFICANT CHANGE UP (ref 11.5–14.5)
SODIUM SERPL-SCNC: 138 MMOL/L — SIGNIFICANT CHANGE UP (ref 135–146)
TRIGL SERPL-MCNC: 72 MG/DL — SIGNIFICANT CHANGE UP
WBC # BLD: 6.57 K/UL — SIGNIFICANT CHANGE UP (ref 4.8–10.8)
WBC # FLD AUTO: 6.57 K/UL — SIGNIFICANT CHANGE UP (ref 4.8–10.8)

## 2021-02-08 PROCEDURE — 99203 OFFICE O/P NEW LOW 30 MIN: CPT

## 2021-02-08 PROCEDURE — 99072 ADDL SUPL MATRL&STAF TM PHE: CPT

## 2021-02-08 NOTE — PHYSICAL EXAM
[General Appearance - Alert] : alert [General Appearance - In No Acute Distress] : in no acute distress [Person] : oriented to person [Place] : oriented to place [Time] : oriented to time [Cranial Nerves Optic (II)] : visual acuity intact bilaterally,  pupils equal round and reactive to light [Cranial Nerves Oculomotor (III)] : extraocular motion intact [Cranial Nerves Trigeminal (V)] : facial sensation intact symmetrically [Cranial Nerves Facial (VII)] : face symmetrical [Cranial Nerves Vestibulocochlear (VIII)] : hearing was intact bilaterally [Cranial Nerves Glossopharyngeal (IX)] : tongue and palate midline [Cranial Nerves Accessory (XI - Cranial And Spinal)] : head turning and shoulder shrug symmetric [Motor Tone] : muscle tone was normal in all four extremities [Motor Strength] : muscle strength was normal in all four extremities [2+] : Patella left 2+ [Straight-Leg Raise Test - Left] : straight leg raise of the left leg was positive [Antalgic] : antalgic [Able to toe walk] : the patient was able to toe walk [Able to heel walk] : the patient was able to heel walk [FreeTextEntry2] : tenderness to palpation on left paraspinal muscles [FreeTextEntry5] : ROMA test + on left

## 2021-02-08 NOTE — PLAN
[FreeTextEntry1] : I am ordering MRI of the lumbar spine w/o contrast to r/o HNP and/pr stenosis given acute symptom she is experiencing. She will continue to do exercises at home. I gave her lumbar exercises/stretches sheet she can do at home. She will follow up once MRI is completed.

## 2021-02-08 NOTE — HISTORY OF PRESENT ILLNESS
[FreeTextEntry1] : left sided LBP radiating to left leg [de-identified] : This is a 46 yrs old female with hx of HIV (CD4 undetectable)presents today reporting of left sided low back pain radiating to the left posterior leg down to the lateral outer of the foot for approximately 3 to 4 week after sleeping on the couch during a slumber party. Denies numbness, tingling, bowel and/or urinary incontinence. Reports of going to see a rehabilitation physician( patient does not recall the physician name) and diagnosis with sciatica and referred to physical therapy. Symptoms are aggravated with all activities, and it is alleviated when lying in a fetal position. She has no imaging.

## 2021-02-09 LAB
4/8 RATIO: 0.91 RATIO — LOW (ref 1.2–3.4)
ABS CD8: 877 /UL — HIGH (ref 206–494)
CD16+CD56+ CELLS NFR BLD: 13 % — SIGNIFICANT CHANGE UP (ref 4–20)
CD16+CD56+ CELLS NFR SPEC: 409 /UL — HIGH (ref 89–385)
CD19 BLASTS SPEC-ACNC: 1015 /UL — HIGH (ref 72–502)
CD19 BLASTS SPEC-ACNC: 32 % — HIGH (ref 10–28)
CD3 BLASTS SPEC-ACNC: 1683 /UL — SIGNIFICANT CHANGE UP (ref 800–2012)
CD3 BLASTS SPEC-ACNC: 54 % — LOW (ref 59–81)
CD4 %: 25 % — LOW (ref 42–58)
CD8 %: 28 % — SIGNIFICANT CHANGE UP (ref 14–30)
HAV IGG SER QL IA: REACTIVE
HAV IGM SER-ACNC: SIGNIFICANT CHANGE UP
HBV CORE AB SER-ACNC: SIGNIFICANT CHANGE UP
HBV SURFACE AB SER-ACNC: SIGNIFICANT CHANGE UP
HBV SURFACE AG SERPL QL IA: SIGNIFICANT CHANGE UP
T PALLIDUM AB TITR SER: NEGATIVE — SIGNIFICANT CHANGE UP
T-CELL CD4 SUBSET PNL BLD: 798 /UL — SIGNIFICANT CHANGE UP (ref 495–1312)
TSH SERPL-MCNC: 1.2 UIU/ML — SIGNIFICANT CHANGE UP (ref 0.27–4.2)

## 2021-02-10 LAB
24R-OH-CALCIDIOL SERPL-MCNC: 58 NG/ML — SIGNIFICANT CHANGE UP (ref 30–80)
GAMMA INTERFERON BACKGROUND BLD IA-ACNC: 0.27 IU/ML — SIGNIFICANT CHANGE UP
M TB IFN-G BLD-IMP: NEGATIVE — SIGNIFICANT CHANGE UP
M TB IFN-G CD4+ BCKGRND COR BLD-ACNC: -0.11 IU/ML — SIGNIFICANT CHANGE UP
M TB IFN-G CD4+CD8+ BCKGRND COR BLD-ACNC: 0.07 IU/ML — SIGNIFICANT CHANGE UP
QUANT TB PLUS MITOGEN MINUS NIL: 7.57 IU/ML — SIGNIFICANT CHANGE UP

## 2021-02-17 DIAGNOSIS — B20 HUMAN IMMUNODEFICIENCY VIRUS [HIV] DISEASE: ICD-10-CM

## 2021-03-03 ENCOUNTER — RESULT REVIEW (OUTPATIENT)
Age: 46
End: 2021-03-03

## 2021-03-03 ENCOUNTER — OUTPATIENT (OUTPATIENT)
Dept: OUTPATIENT SERVICES | Facility: HOSPITAL | Age: 46
LOS: 1 days | Discharge: HOME | End: 2021-03-03
Payer: COMMERCIAL

## 2021-03-03 DIAGNOSIS — M54.16 RADICULOPATHY, LUMBAR REGION: ICD-10-CM

## 2021-03-03 PROCEDURE — 72148 MRI LUMBAR SPINE W/O DYE: CPT | Mod: 26

## 2021-03-08 ENCOUNTER — APPOINTMENT (OUTPATIENT)
Dept: NEUROSURGERY | Facility: CLINIC | Age: 46
End: 2021-03-08
Payer: COMMERCIAL

## 2021-03-08 DIAGNOSIS — M54.16 RADICULOPATHY, LUMBAR REGION: ICD-10-CM

## 2021-03-08 PROCEDURE — 99441: CPT

## 2021-03-08 NOTE — REASON FOR VISIT
[Home] : at home, [unfilled] , at the time of the visit. [Medical Office: ([facility])___] : at the medical office located in

## 2021-03-08 NOTE — HISTORY OF PRESENT ILLNESS
[FreeTextEntry1] : I called Ms. Buckner to discuss MRI results, presented last month with left sided LBP pain radiating to the left posterior leg to the foot, for several weeks. Today she reports of left leg pain which is tolerable, and it is alleviated when she does exercises at home. Denies numbness and tingling. \par \par MRI of the lumbar spine w/o contrast showed L5-S1 large left disc herniation

## 2021-03-18 ENCOUNTER — OUTPATIENT (OUTPATIENT)
Dept: OUTPATIENT SERVICES | Facility: HOSPITAL | Age: 46
LOS: 1 days | Discharge: HOME | End: 2021-03-18
Payer: COMMERCIAL

## 2021-03-18 DIAGNOSIS — B20 HUMAN IMMUNODEFICIENCY VIRUS [HIV] DISEASE: ICD-10-CM

## 2021-03-18 DIAGNOSIS — M19.91 PRIMARY OSTEOARTHRITIS, UNSPECIFIED SITE: ICD-10-CM

## 2021-03-18 PROCEDURE — 99423 OL DIG E/M SVC 21+ MIN: CPT

## 2021-05-06 ENCOUNTER — APPOINTMENT (OUTPATIENT)
Dept: INFECTIOUS DISEASE | Facility: CLINIC | Age: 46
End: 2021-05-06
Payer: COMMERCIAL

## 2021-05-06 ENCOUNTER — OUTPATIENT (OUTPATIENT)
Dept: OUTPATIENT SERVICES | Facility: HOSPITAL | Age: 46
LOS: 1 days | Discharge: HOME | End: 2021-05-06

## 2021-05-06 DIAGNOSIS — M19.91 PRIMARY OSTEOARTHRITIS, UNSPECIFIED SITE: ICD-10-CM

## 2021-05-06 DIAGNOSIS — B20 HUMAN IMMUNODEFICIENCY VIRUS [HIV] DISEASE: ICD-10-CM

## 2021-05-06 DIAGNOSIS — M54.30 SCIATICA, UNSPECIFIED SIDE: ICD-10-CM

## 2021-05-06 PROCEDURE — ZZZZZ: CPT

## 2021-05-06 NOTE — HISTORY OF PRESENT ILLNESS
[FreeTextEntry1] : 47 yo F with PMH of HIV, Sciatica, HLD, Mood disorder, televisit for follow-up for worsening sciatica. \par \par Since last visit, has been able to work with PT. \par She is currently pain free and no longer with sciatica symptoms. \par No longer taking ibuprofen. \par Otherwise feels well. Denies any abdominal pain, nausea, vomiting, fevers, chills. \par Reports compliance with HIV medications. \par \par

## 2021-05-06 NOTE — ASSESSMENT
[FreeTextEntry1] : HIV - well controlled on Juluca\par Sciatics - resolved with PT, motrin PRN\par ASCUS - Anal pap 10/2020 -- will discuss referral during next visit\par \par Televisit in 1 month for routin labs and to discuss ASCUS  [Treatment Education] : treatment education [Treatment Adherence] : treatment adherence

## 2021-06-02 ENCOUNTER — OUTPATIENT (OUTPATIENT)
Dept: OUTPATIENT SERVICES | Facility: HOSPITAL | Age: 46
LOS: 1 days | Discharge: HOME | End: 2021-06-02

## 2021-06-03 ENCOUNTER — APPOINTMENT (OUTPATIENT)
Dept: INFECTIOUS DISEASE | Facility: CLINIC | Age: 46
End: 2021-06-03
Payer: COMMERCIAL

## 2021-06-03 ENCOUNTER — OUTPATIENT (OUTPATIENT)
Dept: OUTPATIENT SERVICES | Facility: HOSPITAL | Age: 46
LOS: 1 days | Discharge: HOME | End: 2021-06-03

## 2021-06-03 DIAGNOSIS — B20 HUMAN IMMUNODEFICIENCY VIRUS [HIV] DISEASE: ICD-10-CM

## 2021-06-03 PROCEDURE — ZZZZZ: CPT

## 2021-06-03 NOTE — REASON FOR VISIT
[Home] : at home, [unfilled] , at the time of the visit. [Medical Office: ([facility])___] : at the medical office located in  [Verbal consent obtained from patient] : the patient, [unfilled] [Follow-Up: _____] : a [unfilled] follow-up visit

## 2021-06-03 NOTE — HISTORY OF PRESENT ILLNESS
[FreeTextEntry1] : 47 yo F with PMH of HIV, Sciatica, HLD, Mood disorder, televisit for follow-up for HIV follow-up\par \par Has had no complaints. \par Sciatica symptoms are no longer. \par Feels well. Denies any abdominal pain, nausea, vomiting, fevers, chills. \par Has not received COVID vaccine yet. \par Compliant with medications\par

## 2021-06-03 NOTE — ASSESSMENT
[FreeTextEntry1] : HIV - well controlled on Juluca\par - will repeat CBC, CMP, VL\par \par Sciatics - resolved with PT, motrin PRN\par \par ASCUS - Anal pap 10/2020\par - has follow-up with Ob gyn during visit in June \par - will follow results -- pending results may need colorectal evaluation \par \par HME\par Pneumovax 2011, PCV13 3/23/2011 -- needs repeat pneumovasx\par Hep A 5/5/2011, 2/16/2012\par Hep B, 5/5/2011, 6/2/2011, 6/30/2014 -- Hep B Sab non reactive \par Hep C Ab negative\par Menacta 6/20/2019, 9/12/2019, \par HPV 10/11/2018, 1/31/2019, 6/20/2019\par \par COVID Vaccine Deferred\par \par  [Treatment Education] : treatment education [Treatment Adherence] : treatment adherence

## 2021-06-14 ENCOUNTER — NON-APPOINTMENT (OUTPATIENT)
Age: 46
End: 2021-06-14

## 2021-06-17 LAB
ALBUMIN SERPL ELPH-MCNC: 4.1 G/DL
ALP BLD-CCNC: 88 U/L
ALT SERPL-CCNC: 12 U/L
ANION GAP SERPL CALC-SCNC: 11 MMOL/L
AST SERPL-CCNC: 17 U/L
BASOPHILS # BLD AUTO: 0.04 K/UL
BASOPHILS NFR BLD AUTO: 0.6 %
BILIRUB SERPL-MCNC: <0.2 MG/DL
BUN SERPL-MCNC: 10 MG/DL
CALCIUM SERPL-MCNC: 9.1 MG/DL
CHLORIDE SERPL-SCNC: 105 MMOL/L
CO2 SERPL-SCNC: 20 MMOL/L
CREAT SERPL-MCNC: 1.1 MG/DL
EOSINOPHIL # BLD AUTO: 0.12 K/UL
EOSINOPHIL NFR BLD AUTO: 1.7 %
GLUCOSE SERPL-MCNC: 103 MG/DL
HCT VFR BLD CALC: 36.1 %
HGB BLD-MCNC: 12.2 G/DL
HIV1 RNA # SERPL NAA+PROBE: NOT DETECTED
IMM GRANULOCYTES NFR BLD AUTO: 0.4 %
LYMPHOCYTES # BLD AUTO: 3.3 K/UL
LYMPHOCYTES NFR BLD AUTO: 46.2 %
MAN DIFF?: NORMAL
MCHC RBC-ENTMCNC: 31.4 PG
MCHC RBC-ENTMCNC: 33.8 G/DL
MCV RBC AUTO: 92.8 FL
MONOCYTES # BLD AUTO: 0.58 K/UL
MONOCYTES NFR BLD AUTO: 8.1 %
NEUTROPHILS # BLD AUTO: 3.07 K/UL
NEUTROPHILS NFR BLD AUTO: 43 %
PLATELET # BLD AUTO: 262 K/UL
POTASSIUM SERPL-SCNC: 4.5 MMOL/L
PROT SERPL-MCNC: 6.4 G/DL
RBC # BLD: 3.89 M/UL
RBC # FLD: 12.4 %
SODIUM SERPL-SCNC: 136 MMOL/L
VIRAL LOAD LOG: NOT DETECTED LOGCOPIES/ML
WBC # FLD AUTO: 7.14 K/UL

## 2021-07-01 ENCOUNTER — APPOINTMENT (OUTPATIENT)
Dept: INFECTIOUS DISEASE | Facility: CLINIC | Age: 46
End: 2021-07-01

## 2021-07-08 ENCOUNTER — APPOINTMENT (OUTPATIENT)
Dept: ANTEPARTUM | Facility: CLINIC | Age: 46
End: 2021-07-08
Payer: COMMERCIAL

## 2021-07-08 ENCOUNTER — OUTPATIENT (OUTPATIENT)
Dept: OUTPATIENT SERVICES | Facility: HOSPITAL | Age: 46
LOS: 1 days | Discharge: HOME | End: 2021-07-08

## 2021-07-08 ENCOUNTER — ASOB RESULT (OUTPATIENT)
Age: 46
End: 2021-07-08

## 2021-07-08 PROCEDURE — 76830 TRANSVAGINAL US NON-OB: CPT | Mod: 26

## 2021-07-16 ENCOUNTER — OUTPATIENT (OUTPATIENT)
Dept: OUTPATIENT SERVICES | Facility: HOSPITAL | Age: 46
LOS: 1 days | Discharge: HOME | End: 2021-07-16

## 2021-07-16 ENCOUNTER — APPOINTMENT (OUTPATIENT)
Dept: OBGYN | Facility: CLINIC | Age: 46
End: 2021-07-16
Payer: COMMERCIAL

## 2021-07-16 VITALS — WEIGHT: 167 LBS | SYSTOLIC BLOOD PRESSURE: 100 MMHG | DIASTOLIC BLOOD PRESSURE: 70 MMHG | BODY MASS INDEX: 26.16 KG/M2

## 2021-07-16 PROCEDURE — 99213 OFFICE O/P EST LOW 20 MIN: CPT

## 2021-07-17 NOTE — HISTORY OF PRESENT ILLNESS
[FreeTextEntry1] : 45yo P2 with LMP 7/16, h/o HIV pos, presenting for f/u of left ovarian cyst.  Patient presented ED 11/2020 for abdominal pain, found to have 5cm left adnexal cyst which was stable on sonogram 12/2020.  She opted for expectant management at that time.  Repeat sono on 7/8/2021 showed a simple cyst now 2.5cm in size.  Patient denies any abdominal pain, vaginal discharge, dysuria, hematuria.  No other complaints.

## 2021-07-17 NOTE — DISCUSSION/SUMMARY
[FreeTextEntry1] : 45yo P2 with LMP 7/16, h/o HIV, now with simple appearing left adnexal cyst, decreasing in size, asymptomatic, doing well.\par \par -Discussed that since cyst is decreasing in size and pain is completely resolved, expectant management is reasonable option, patient voiced understanding and agrees\par -repeat ultrasound prior to next visit (11/2021)\par -torsion precautions discussed\par -RTC in 11/2021 for annual, requires pap smear (anal and cervical) at that time

## 2021-07-19 ENCOUNTER — NON-APPOINTMENT (OUTPATIENT)
Age: 46
End: 2021-07-19

## 2021-07-19 ENCOUNTER — OUTPATIENT (OUTPATIENT)
Dept: OUTPATIENT SERVICES | Facility: HOSPITAL | Age: 46
LOS: 1 days | Discharge: HOME | End: 2021-07-19

## 2021-07-22 ENCOUNTER — APPOINTMENT (OUTPATIENT)
Dept: INFECTIOUS DISEASE | Facility: CLINIC | Age: 46
End: 2021-07-22
Payer: COMMERCIAL

## 2021-07-26 ENCOUNTER — OUTPATIENT (OUTPATIENT)
Dept: OUTPATIENT SERVICES | Facility: HOSPITAL | Age: 46
LOS: 1 days | Discharge: HOME | End: 2021-07-26

## 2021-07-26 ENCOUNTER — NON-APPOINTMENT (OUTPATIENT)
Age: 46
End: 2021-07-26

## 2021-07-28 ENCOUNTER — NON-APPOINTMENT (OUTPATIENT)
Age: 46
End: 2021-07-28

## 2021-07-28 ENCOUNTER — OUTPATIENT (OUTPATIENT)
Dept: OUTPATIENT SERVICES | Facility: HOSPITAL | Age: 46
LOS: 1 days | Discharge: HOME | End: 2021-07-28

## 2021-07-29 ENCOUNTER — OUTPATIENT (OUTPATIENT)
Dept: OUTPATIENT SERVICES | Facility: HOSPITAL | Age: 46
LOS: 1 days | Discharge: HOME | End: 2021-07-29

## 2021-07-29 ENCOUNTER — APPOINTMENT (OUTPATIENT)
Dept: INFECTIOUS DISEASE | Facility: CLINIC | Age: 46
End: 2021-07-29
Payer: COMMERCIAL

## 2021-07-29 DIAGNOSIS — B20 HUMAN IMMUNODEFICIENCY VIRUS [HIV] DISEASE: ICD-10-CM

## 2021-07-29 PROCEDURE — 99213 OFFICE O/P EST LOW 20 MIN: CPT | Mod: 95

## 2021-07-29 NOTE — REASON FOR VISIT
[Follow-Up: _____] : a [unfilled] follow-up visit [FreeTextEntry1] : med compliance - discuss ASCUS results

## 2021-07-29 NOTE — ASSESSMENT
[FreeTextEntry1] : HIV - well controlled on Juluca\par - VL 6/4/2021 UD\par - CD4 7989/25% 2/2021 \par - repeat labs due in 9/2021 \par \par Sciatics - resolved with PT, motrin PRN\par \par ASCUS - Anal pap 10/2020\par - has follow-up with Ob gyn during visit in June \par - planned for anal/cervical Pap in 11/2021with OB-Gyn but requesting follow-up with [facility]; will try to coordinate\par \par HME\par Pneumovax 2011, PCV13 3/23/2011 -- needs repeat pneumovasx\par Hep A 5/5/2011, 2/16/2012\par Hep B, 5/5/2011, 6/2/2011, 6/30/2014 -- Hep B Sab non reactive \par Hep C Ab negative\par Menacta 6/20/2019, 9/12/2019, \par HPV 10/11/2018, 1/31/2019, 6/20/2019\par \par Discussed COVID Vaccine and heightened risk with Delta variant -- she is "50/50" \par \par RTC in 9/2021 for repeat labs -- televisit and she is not comfortable for inperson visits \par \par .

## 2021-07-29 NOTE — HISTORY OF PRESENT ILLNESS
[Home] : at home, [unfilled] , at the time of the visit. [Medical Office: ([facility])___] : at the medical office located in  [FreeTextEntry1] : 45 yo F with PMH of HIV, Sciatica, HLD, Mood disorder, televisit for follow-up for HIV follow-up\par \mac Was seen by Ob-gyn 7/16/2021 -- very concerned about hx of ASCUS in 10/2020; She prefers to follow-up with [facility]. \par Reviewed labs 6/4/2021. \par Otherwise has no complaints. Compliant with medications\par Feels well. Denies any abdominal pain, nausea, vomiting, fevers, chills. \par Has not received COVID vaccine yet.\par \par

## 2021-08-04 DIAGNOSIS — B20 HUMAN IMMUNODEFICIENCY VIRUS [HIV] DISEASE: ICD-10-CM

## 2021-08-09 DIAGNOSIS — B20 HUMAN IMMUNODEFICIENCY VIRUS [HIV] DISEASE: ICD-10-CM

## 2021-09-07 ENCOUNTER — OUTPATIENT (OUTPATIENT)
Dept: OUTPATIENT SERVICES | Facility: HOSPITAL | Age: 46
LOS: 1 days | Discharge: HOME | End: 2021-09-07

## 2021-09-09 ENCOUNTER — OUTPATIENT (OUTPATIENT)
Dept: OUTPATIENT SERVICES | Facility: HOSPITAL | Age: 46
LOS: 1 days | Discharge: HOME | End: 2021-09-09

## 2021-09-09 ENCOUNTER — APPOINTMENT (OUTPATIENT)
Dept: INFECTIOUS DISEASE | Facility: CLINIC | Age: 46
End: 2021-09-09
Payer: COMMERCIAL

## 2021-09-09 DIAGNOSIS — B20 HUMAN IMMUNODEFICIENCY VIRUS [HIV] DISEASE: ICD-10-CM

## 2021-09-09 PROCEDURE — 99213 OFFICE O/P EST LOW 20 MIN: CPT

## 2021-09-20 DIAGNOSIS — B20 HUMAN IMMUNODEFICIENCY VIRUS [HIV] DISEASE: ICD-10-CM

## 2021-09-23 LAB
ANION GAP SERPL CALC-SCNC: 9 MMOL/L
BASOPHILS # BLD AUTO: 0.03 K/UL
BASOPHILS NFR BLD AUTO: 0.5 %
BUN SERPL-MCNC: 9 MG/DL
CALCIUM SERPL-MCNC: 9.1 MG/DL
CD16+CD56+ CELLS # BLD: 478 /UL
CD16+CD56+ CELLS NFR BLD: 16 %
CD19 CELLS NFR BLD: 800 /UL
CD3 CELLS # BLD: 1628 /UL
CD3 CELLS NFR BLD: 56 %
CD3+CD4+ CELLS # BLD: 768 /UL
CD3+CD4+ CELLS NFR BLD: 26 %
CD3+CD4+ CELLS/CD3+CD8+ CLL SPEC: 0.89 RATIO
CD3+CD8+ CELLS # SPEC: 866 /UL
CD3+CD8+ CELLS NFR BLD: 30 %
CELLS.CD3-CD19+/CELLS IN BLOOD: 27 %
CHLORIDE SERPL-SCNC: 105 MMOL/L
CO2 SERPL-SCNC: 26 MMOL/L
CREAT SERPL-MCNC: 0.9 MG/DL
EOSINOPHIL # BLD AUTO: 0.12 K/UL
EOSINOPHIL NFR BLD AUTO: 2.1 %
GLUCOSE SERPL-MCNC: 106 MG/DL
HCT VFR BLD CALC: 36.1 %
HGB BLD-MCNC: 12 G/DL
IMM GRANULOCYTES NFR BLD AUTO: 0 %
LYMPHOCYTES # BLD AUTO: 2.64 K/UL
LYMPHOCYTES NFR BLD AUTO: 45.5 %
MAN DIFF?: NORMAL
MCHC RBC-ENTMCNC: 31 PG
MCHC RBC-ENTMCNC: 33.2 G/DL
MCV RBC AUTO: 93.3 FL
MONOCYTES # BLD AUTO: 0.52 K/UL
MONOCYTES NFR BLD AUTO: 9 %
NEUTROPHILS # BLD AUTO: 2.49 K/UL
NEUTROPHILS NFR BLD AUTO: 42.9 %
PLATELET # BLD AUTO: 320 K/UL
POTASSIUM SERPL-SCNC: 4.7 MMOL/L
RBC # BLD: 3.87 M/UL
RBC # FLD: 12.7 %
SODIUM SERPL-SCNC: 140 MMOL/L
VIRAL LOAD INTERP: NOT DETECTED COPIES/ML
VIRAL LOAD LOG: NOT DETECTED LOGCOPIES/ML
WBC # FLD AUTO: 5.8 K/UL

## 2021-09-28 ENCOUNTER — NON-APPOINTMENT (OUTPATIENT)
Age: 46
End: 2021-09-28

## 2021-10-05 ENCOUNTER — APPOINTMENT (OUTPATIENT)
Dept: SURGERY | Facility: CLINIC | Age: 46
End: 2021-10-05
Payer: COMMERCIAL

## 2021-10-05 VITALS
DIASTOLIC BLOOD PRESSURE: 68 MMHG | HEIGHT: 67 IN | WEIGHT: 169 LBS | TEMPERATURE: 97.6 F | HEART RATE: 108 BPM | OXYGEN SATURATION: 98 % | BODY MASS INDEX: 26.53 KG/M2 | SYSTOLIC BLOOD PRESSURE: 116 MMHG

## 2021-10-05 PROCEDURE — 99213 OFFICE O/P EST LOW 20 MIN: CPT | Mod: 25

## 2021-10-05 PROCEDURE — 99072 ADDL SUPL MATRL&STAF TM PHE: CPT

## 2021-10-05 PROCEDURE — 46600 DIAGNOSTIC ANOSCOPY SPX: CPT

## 2021-10-05 NOTE — PROCEDURE
[FreeTextEntry1] : Anoscopy performed using a disposable anoscope.  The patient was place in the left lateral decubitus position. After JOSE was performed the anoscope was inserted and the distal rectum was evaluated along with the anal canal and anal margin.\par \par Findings:\par Examination of the perineum reveals mild perianal excoriation, there is no erythema induration drainage or bleeding. There is no evidence of perianal condyloma.\par \par JOSE, diminished tone, no palpable mass.\par \par Anoscopy, nonprolapsing internal hemorrhoids no evidence of intraanal condyloma.

## 2021-10-05 NOTE — PHYSICAL EXAM
[Excoriation] : excoriations [Multiple Sinus Tracts] : no perianal sinus tracts [Fistula] : no fistulas [Wart] : no warts [Ulcer ___ cm] : no ulcers [Pilonidal Cyst] : no pilonidal cysts [Pilonidal Sinus] : no pilonidal sinus [Pilonidal Sinus Draining] : no pilonidal sinus drainage [Nonprolapsing] : a nonprolapsing (grade I) [Lax] : was lax [None] : there was no rectal mass  [Alert] : alert [Oriented to Person] : oriented to person [Oriented to Place] : oriented to place [Oriented to Time] : oriented to time [Calm] : calm [de-identified] : Healthy female, NAD [de-identified] : Full range of motion [de-identified] : No focal deficits.

## 2021-10-05 NOTE — HISTORY OF PRESENT ILLNESS
[FreeTextEntry1] : 45 yo F with PMH of HIV, Sciatica, HLD, Mood disorder, televisit for follow-up for HIV follow-up\par \par No new complaints. \par Reports 100% compliance with juluca \par She recently completed Pfizer vaccine. \par Discussed findings of ASCUC on anal pap smear in 10/2020 \par \par \par

## 2021-10-05 NOTE — HISTORY OF PRESENT ILLNESS
[FreeTextEntry1] : This is a return visit for Ms. MARTINEZ who was last seen for a followup for her anal pap. She returns now for routine follow-up after a repeat anal Pap.

## 2021-10-05 NOTE — ASSESSMENT
[FreeTextEntry1] : Ms. MARTINEZ had an abnormal anal Pap. The results are ASCUS. She has no findings on physical exam. I have advised that we continue routine follow-up at this time. She states that she is due for a repeat anal Pap in November. I have advised that she follow-up after that is done particularly if the results have changed.

## 2021-10-05 NOTE — ASSESSMENT
[Treatment Education] : treatment education [Treatment Adherence] : treatment adherence [FreeTextEntry1] : Will repeat HIV labs today\par Discussed ASCUS seen on anal PAP 10/2020 -- recommended that she follow-up with colorectal surgery \par Had Cervical pap smear 10/2020 - negative -- will go for OB-gyn follow-up in 11/2021 \par Continue Juluca\par \par televisit in 3 months \par ___________________________________\par \par \par HIV - well controlled on Juluca\par - VL 6/4/2021 UD\par - CD4 7989/25% 2/2021 \par \par Sciatics - resolved with PT, motrin PRN\par \par ASCUS - Anal pap 10/2020\par - has follow-up with Ob gyn during visit in June \par - planned for anal/cervical Pap in 11/2021with OB-Gyn but requesting follow-up with [facility]; will try to coordinate\par \par HME\par Pneumovax 2011, PCV13 3/23/2011 -- needs repeat pneumovasx\par Hep A 5/5/2011, 2/16/2012\par Hep B, 5/5/2011, 6/2/2011, 6/30/2014 -- Hep B Sab non reactive \par Hep C Ab negative\par Menacta 6/20/2019, 9/12/2019, \par HPV 10/11/2018, 1/31/2019, 6/20/2019\par Completed Pfizer in 8/2021 \par \par

## 2021-10-05 NOTE — REASON FOR VISIT
[Home] : at home, [unfilled] , at the time of the visit. [Medical Office: ([facility])___] : at the medical office located in  [Verbal consent obtained from patient] : the patient, [unfilled] [Follow-Up: _____] : a [unfilled] follow-up visit

## 2021-11-16 DIAGNOSIS — J44.9 CHRONIC OBSTRUCTIVE PULMONARY DISEASE, UNSPECIFIED: ICD-10-CM

## 2021-11-18 DIAGNOSIS — B20 HUMAN IMMUNODEFICIENCY VIRUS [HIV] DISEASE: ICD-10-CM

## 2021-12-08 ENCOUNTER — NON-APPOINTMENT (OUTPATIENT)
Age: 46
End: 2021-12-08

## 2021-12-08 ENCOUNTER — OUTPATIENT (OUTPATIENT)
Dept: OUTPATIENT SERVICES | Facility: HOSPITAL | Age: 46
LOS: 1 days | Discharge: HOME | End: 2021-12-08

## 2021-12-09 ENCOUNTER — APPOINTMENT (OUTPATIENT)
Dept: INFECTIOUS DISEASE | Facility: CLINIC | Age: 46
End: 2021-12-09
Payer: COMMERCIAL

## 2021-12-09 ENCOUNTER — OUTPATIENT (OUTPATIENT)
Dept: OUTPATIENT SERVICES | Facility: HOSPITAL | Age: 46
LOS: 1 days | Discharge: HOME | End: 2021-12-09

## 2021-12-09 DIAGNOSIS — B20 HUMAN IMMUNODEFICIENCY VIRUS [HIV] DISEASE: ICD-10-CM

## 2021-12-09 PROCEDURE — ZZZZZ: CPT

## 2021-12-09 NOTE — ASSESSMENT
[FreeTextEntry1] : HIV - well controlled on Juluca\par - VL 9/20/21 UD\par - CD4 768 9/20/2021 \par - continue Juluca \par \par ASCUS - Anal pap 10/2020\par - has follow-up with Ob gyn during visit in June 2021\par - Anoscopy performed 10/5/2021 without suspicion lesions \par - needs repeat anal pap -- has follow-up with OB-gyn \par \par \par \par HME\par Pneumovax 2011, PCV13 3/23/2011 -- needs repeat pneumovasx at next visit \par Hep A 5/5/2011, 2/16/2012\par Hep B, 5/5/2011, 6/2/2011, 6/30/2014 -- Hep B Sab non reactive \par Hep C Ab negative\par Menacta 6/20/2019, 9/12/2019\par HPV 10/11/2018, 1/31/2019, 6/20/2019\par Completed Pfizer in 8/2021 \par Recommended Flu Shot\par \par

## 2021-12-09 NOTE — HISTORY OF PRESENT ILLNESS
[Home] : at home, [unfilled] , at the time of the visit. [Medical Office: ([facility])___] : at the medical office located in  [Verbal consent obtained from patient] : the patient, [unfilled] [FreeTextEntry1] : 47 yo F with PMH of HIV, Sciatica, HLD, Mood disorder, televisit for follow-up for HIV follow-up\par \par Otherwise feels well. Reviewed colorectal surgery notes. She has not been seen by OB-gyn yet. Will be making follow-up appointment soon. \par \par

## 2022-01-06 DIAGNOSIS — B20 HUMAN IMMUNODEFICIENCY VIRUS [HIV] DISEASE: ICD-10-CM

## 2022-01-20 ENCOUNTER — APPOINTMENT (OUTPATIENT)
Dept: INFECTIOUS DISEASE | Facility: CLINIC | Age: 47
End: 2022-01-20
Payer: COMMERCIAL

## 2022-01-20 ENCOUNTER — OUTPATIENT (OUTPATIENT)
Dept: OUTPATIENT SERVICES | Facility: HOSPITAL | Age: 47
LOS: 1 days | Discharge: HOME | End: 2022-01-20

## 2022-01-20 DIAGNOSIS — E78.5 HYPERLIPIDEMIA, UNSPECIFIED: ICD-10-CM

## 2022-01-20 DIAGNOSIS — B20 HUMAN IMMUNODEFICIENCY VIRUS [HIV] DISEASE: ICD-10-CM

## 2022-01-20 PROCEDURE — ZZZZZ: CPT

## 2022-01-20 RX ORDER — EMTRICITABINE, RILPIVIRINE HYDROCHLORIDE, AND TENOFOVIR ALAFENAMIDE 200; 25; 25 MG/1; MG/1; MG/1
200-25-25 TABLET ORAL
Refills: 0 | Status: DISCONTINUED | COMMUNITY
End: 2022-01-20

## 2022-01-20 NOTE — ASSESSMENT
[FreeTextEntry1] : HIV - well controlled on Juluca\par - VL 9/20/21 UD\par - CD4 768 9/20/2021 \par - continue Juluca \par - will check annual labs \par \par ASCUS - Anal pap 10/2020\par - Anoscopy performed 10/5/2021 without suspicion lesions \par - needs repeat anal pap -- has follow-up with OB-gyn -- discussed with her to make appointment \par \par HTN - on enalparil 5 mg daily\par \par HLD - repeat lipid panel, atorvastatin 40 mg daily \par \par HME\par Pneumovax 2011, PCV13 3/23/2011 -- needs repeat pneumovasx at next visit \par Hep A 5/5/2011, 2/16/2012\par Hep B, 5/5/2011, 6/2/2011, 6/30/2014 -- Hep B Sab non reactive \par Hep C Ab negative\par Menacta 6/20/2019, 9/12/2019\par HPV 10/11/2018, 1/31/2019, 6/20/2019\par Completed Pfizer in 8/2021 - she is scheduled for booster this week \par Recommended Flu Shot\par \par RTC in 3 months in person

## 2022-01-20 NOTE — HISTORY OF PRESENT ILLNESS
[Home] : at home, [unfilled] , at the time of the visit. [Medical Office: ([facility])___] : at the medical office located in  [Verbal consent obtained from patient] : the patient, [unfilled] [FreeTextEntry1] : 47 yo F with PMH of HIV, Sciatica, HLD, Mood disorder, televisit for follow-up for HIV follow-up\par \par \par Unable to see in person due to Omicron wave. \par \par She otherwise feels well. \par Denies any fevers, chills, nausea, vomiting, abdominal pain. \par Reports compliance with Juluca. \par She has not made her Ob-gyn appointment yet, but will be making follow-up soon. \par \par

## 2022-01-26 ENCOUNTER — OUTPATIENT (OUTPATIENT)
Dept: OUTPATIENT SERVICES | Facility: HOSPITAL | Age: 47
LOS: 1 days | Discharge: HOME | End: 2022-01-26

## 2022-04-14 ENCOUNTER — OUTPATIENT (OUTPATIENT)
Dept: OUTPATIENT SERVICES | Facility: HOSPITAL | Age: 47
LOS: 1 days | Discharge: HOME | End: 2022-04-14

## 2022-04-14 ENCOUNTER — APPOINTMENT (OUTPATIENT)
Dept: INFECTIOUS DISEASE | Facility: CLINIC | Age: 47
End: 2022-04-14
Payer: COMMERCIAL

## 2022-04-14 VITALS
BODY MASS INDEX: 25.9 KG/M2 | OXYGEN SATURATION: 97 % | HEIGHT: 67 IN | RESPIRATION RATE: 16 BRPM | TEMPERATURE: 98.2 F | HEART RATE: 98 BPM | DIASTOLIC BLOOD PRESSURE: 78 MMHG | SYSTOLIC BLOOD PRESSURE: 107 MMHG | WEIGHT: 165 LBS

## 2022-04-14 DIAGNOSIS — I10 ESSENTIAL (PRIMARY) HYPERTENSION: ICD-10-CM

## 2022-04-14 DIAGNOSIS — Z23 ENCOUNTER FOR IMMUNIZATION: ICD-10-CM

## 2022-04-14 DIAGNOSIS — E78.5 HYPERLIPIDEMIA, UNSPECIFIED: ICD-10-CM

## 2022-04-14 DIAGNOSIS — B20 HUMAN IMMUNODEFICIENCY VIRUS [HIV] DISEASE: ICD-10-CM

## 2022-04-14 PROCEDURE — 99214 OFFICE O/P EST MOD 30 MIN: CPT

## 2022-04-14 NOTE — HISTORY OF PRESENT ILLNESS
[FreeTextEntry1] : 46 yo F with PMH of HIV, Sciatica, HLD, Mood disorder, televisit for follow-up for HIV follow-up\par \par Reviewed labs from 2/9/2022. \par She otherwise feels well. \par Denies any fevers, chills, nausea, vomiting, abdominal pain. \par Reports compliance with Juluca. \par \par She has Ob-gyn follow-up coming up tomorrow. \par \par

## 2022-04-14 NOTE — ASSESSMENT
[FreeTextEntry1] : HIV - well controlled on Juluca\par - VL 2/9/22 UD\par - CD4 821 2/9/2022 \par - continue Juluca \par - repaet labs due 5/9/2022 \par \par ASCUS - Anal pap 10/2020\par - Anoscopy performed 10/5/2021 without suspicion lesions \par - needs repeat anal pap -- has follow-up with OB-gyn\par \par HTN - \par - controlled\par - on enalparil 5 mg daily, continue\par \par HLD -\par - controlled \par - continue atorvastatin 40 mg daily \par \par HME\par Pneumovax 2011, PCV13 3/23/2011 -- repeat Pneumovax today \par Hep A 5/5/2011, 2/16/2012\par Hep B, 5/5/2011, 6/2/2011, 6/30/2014 -- Hep B Sab non reactive \par Hep C Ab negative\par Menacta 6/20/2019, 9/12/2019\par HPV 10/11/2018, 1/31/2019, 6/20/2019\par Completed Pfizer in 8/2021, received booster\par \par RTC in 5/2022

## 2022-04-18 LAB
25(OH)D3 SERPL-MCNC: 52 NG/ML
ALBUMIN SERPL ELPH-MCNC: 4.4 G/DL
ALP BLD-CCNC: 91 U/L
ALT SERPL-CCNC: 17 U/L
ANION GAP SERPL CALC-SCNC: 12 MMOL/L
APPEARANCE: CLEAR
AST SERPL-CCNC: 24 U/L
BASOPHILS # BLD AUTO: 0.06 K/UL
BASOPHILS NFR BLD AUTO: 0.9 %
BILIRUB SERPL-MCNC: <0.2 MG/DL
BILIRUBIN URINE: NEGATIVE
BLOOD URINE: ABNORMAL
BUN SERPL-MCNC: 9 MG/DL
CALCIUM SERPL-MCNC: 9.3 MG/DL
CD16+CD56+ CELLS # BLD: 553 /UL
CD16+CD56+ CELLS NFR BLD: 17 %
CD19 CELLS NFR BLD: 920 /UL
CD3 CELLS # BLD: 1794 /UL
CD3 CELLS NFR BLD: 54 %
CD3+CD4+ CELLS # BLD: 821 /UL
CD3+CD4+ CELLS NFR BLD: 25 %
CD3+CD4+ CELLS/CD3+CD8+ CLL SPEC: 0.85 RATIO
CD3+CD8+ CELLS # SPEC: 968 /UL
CD3+CD8+ CELLS NFR BLD: 29 %
CELLS.CD3-CD19+/CELLS IN BLOOD: 28 %
CHLORIDE SERPL-SCNC: 107 MMOL/L
CHOLEST SERPL-MCNC: 147 MG/DL
CO2 SERPL-SCNC: 19 MMOL/L
COLOR: NORMAL
CREAT SERPL-MCNC: 0.9 MG/DL
EOSINOPHIL # BLD AUTO: 0.1 K/UL
EOSINOPHIL NFR BLD AUTO: 1.4 %
ESTIMATED AVERAGE GLUCOSE: 111 MG/DL
GLUCOSE QUALITATIVE U: NEGATIVE
GLUCOSE SERPL-MCNC: 112 MG/DL
HBA1C MFR BLD HPLC: 5.5 %
HBV SURFACE AB SERPL IA-ACNC: <3 MIU/ML
HBV SURFACE AG SER QL: NONREACTIVE
HCT VFR BLD CALC: 38 %
HCV AB SER QL: NONREACTIVE
HCV S/CO RATIO: 0.08 S/CO
HDLC SERPL-MCNC: 57 MG/DL
HEPATITIS A IGG ANTIBODY: REACTIVE
HGB BLD-MCNC: 12.5 G/DL
IMM GRANULOCYTES NFR BLD AUTO: 0.1 %
KETONES URINE: NEGATIVE
LDH SERPL-CCNC: 184 IU/L
LDH1 CFR SERPL ELPH: 20 %
LDH2 CFR SERPL ELPH: 34 %
LDH3 CFR SERPL ELPH: 24 %
LDH4 CFR SERPL ELPH: 9 %
LDH5 CFR SERPL ELPH: 13 %
LDLC SERPL CALC-MCNC: 70 MG/DL
LEUKOCYTE ESTERASE URINE: NEGATIVE
LYMPHOCYTES # BLD AUTO: 3.11 K/UL
LYMPHOCYTES NFR BLD AUTO: 44.3 %
MAN DIFF?: NORMAL
MCHC RBC-ENTMCNC: 30.4 PG
MCHC RBC-ENTMCNC: 32.9 G/DL
MCV RBC AUTO: 92.5 FL
MONOCYTES # BLD AUTO: 0.45 K/UL
MONOCYTES NFR BLD AUTO: 6.4 %
NEUTROPHILS # BLD AUTO: 3.29 K/UL
NEUTROPHILS NFR BLD AUTO: 46.9 %
NITRITE URINE: NEGATIVE
NONHDLC SERPL-MCNC: 90 MG/DL
PH URINE: 6.5
PLATELET # BLD AUTO: 308 K/UL
POTASSIUM SERPL-SCNC: 4.8 MMOL/L
PROT SERPL-MCNC: 6.9 G/DL
PROTEIN URINE: NEGATIVE
RBC # BLD: 4.11 M/UL
RBC # FLD: 13.2 %
SODIUM SERPL-SCNC: 138 MMOL/L
SPECIFIC GRAVITY URINE: 1.02
T PALLIDUM AB SER QL IA: NEGATIVE
TRIGL SERPL-MCNC: 99 MG/DL
TSH SERPL-ACNC: 0.73 UIU/ML
UROBILINOGEN URINE: NORMAL
VIRAL LOAD INTERP: NOT DETECTED COPIES/ML
VIRAL LOAD LOG: NOT DETECTED LOGCOPIES/ML
WBC # FLD AUTO: 7.02 K/UL

## 2022-05-12 ENCOUNTER — APPOINTMENT (OUTPATIENT)
Dept: INFECTIOUS DISEASE | Facility: CLINIC | Age: 47
End: 2022-05-12
Payer: COMMERCIAL

## 2022-05-12 ENCOUNTER — OUTPATIENT (OUTPATIENT)
Dept: OUTPATIENT SERVICES | Facility: HOSPITAL | Age: 47
LOS: 1 days | Discharge: HOME | End: 2022-05-12

## 2022-05-12 DIAGNOSIS — D64.9 ANEMIA, UNSPECIFIED: ICD-10-CM

## 2022-05-12 DIAGNOSIS — B20 HUMAN IMMUNODEFICIENCY VIRUS [HIV] DISEASE: ICD-10-CM

## 2022-05-12 PROCEDURE — ZZZZZ: CPT

## 2022-05-12 NOTE — ASSESSMENT
[FreeTextEntry1] : HIV - well controlled on Juluca\par - VL 2/9/2022 UD\par - CD4 821 2/9/2022 \par - continue Juluca \par - will follow-up on HIV VL which is pending \par \par ASCUS - Anal pap 10/2020\par - Anoscopy performed 10/5/2021 without suspicion lesions \par - needs repeat anal pap -- has follow-up with OB-gyn on 6/3\par \par HTN - on enalapril 5 mg daily\par \par HLD - continue atorvastatin 40 mg daily \par \par HME\par Pneumovax 2011, PCV13 3/23/2011 -- needs repeat pneumovasx at next visit \par Hep A 5/5/2011, 2/16/2012\par Hep B, 5/5/2011, 6/2/2011, 6/30/2014 -- Hep B Sab non reactive \par Hep C Ab negative\par Menacta 6/20/2019, 9/12/2019\par HPV 10/11/2018, 1/31/2019, 6/20/2019\par Completed Pfizer in 8/2021 and booster\par \par

## 2022-05-12 NOTE — REASON FOR VISIT
[Home] : at home, [unfilled] , at the time of the visit. [Medical Office: ([facility])___] : at the medical office located in  [Verbal consent obtained from patient] : the patient, [unfilled] [Follow-Up: _____] : a [unfilled] follow-up visit [FreeTextEntry1] : HIV follow-up

## 2022-05-12 NOTE — HISTORY OF PRESENT ILLNESS
[FreeTextEntry1] : 46 yo F with PMH of HIV, Sciatica, HLD, Mood disorder, televisit for follow-up for HIV follow-up\par \par Reviewed labs from 5/9/2022\par HIV VL not returned yet. \par She has no complaints \par Reports compliance with Juluca. \par \par \par

## 2022-05-13 PROBLEM — D64.9 ANEMIA, UNSPECIFIED TYPE: Status: ACTIVE | Noted: 2022-05-13

## 2022-05-17 ENCOUNTER — OUTPATIENT (OUTPATIENT)
Dept: OUTPATIENT SERVICES | Facility: HOSPITAL | Age: 47
LOS: 1 days | Discharge: HOME | End: 2022-05-17

## 2022-05-17 ENCOUNTER — APPOINTMENT (OUTPATIENT)
Dept: INFECTIOUS DISEASE | Facility: CLINIC | Age: 47
End: 2022-05-17
Payer: COMMERCIAL

## 2022-05-17 DIAGNOSIS — B20 HUMAN IMMUNODEFICIENCY VIRUS [HIV] DISEASE: ICD-10-CM

## 2022-05-17 PROCEDURE — ZZZZZ: CPT

## 2022-05-17 NOTE — REVIEW OF SYSTEMS
[As Noted in HPI] : as noted in HPI [Chills] : chills [Body Aches] : body aches [Negative] : Heme/Lymph

## 2022-05-17 NOTE — HISTORY OF PRESENT ILLNESS
[FreeTextEntry1] : 48 yo F with PMH of HIV, Sciatica, HLD, Mood disorder, televisit for follow-up for recently diagnosed COVID.\par \par Her daughter recently with COVID. \par She started to have aches and chills on 5/16. \par She performed a home test and was found to be positive. \par She denies any nausea, vomiting, shortness of breath. \par Has sore throat and mild cough. \par \par \par

## 2022-05-17 NOTE — REASON FOR VISIT
[Home] : at home, [unfilled] , at the time of the visit. [Medical Office: ([facility])___] : at the medical office located in  [Verbal consent obtained from patient] : the patient, [unfilled] [Follow-Up: _____] : a [unfilled] follow-up visit [FreeTextEntry1] : televisit for COVID positive

## 2022-05-17 NOTE — ASSESSMENT
[FreeTextEntry1] : COVID-19 infection \par - will send script for Paxlovid for 5 days\par - Drug drug interaction reviewed -- recommended for her to hold statin for 5 days while taking paxlovid\par \par Follow-up televisit in 1 week

## 2022-05-20 LAB
ALBUMIN SERPL ELPH-MCNC: 4.5 G/DL
ALP BLD-CCNC: 78 U/L
ALT SERPL-CCNC: 13 U/L
ANION GAP SERPL CALC-SCNC: 14 MMOL/L
AST SERPL-CCNC: 18 U/L
BASOPHILS # BLD AUTO: 0.03 K/UL
BASOPHILS NFR BLD AUTO: 0.4 %
BILIRUB SERPL-MCNC: 0.3 MG/DL
BUN SERPL-MCNC: 7 MG/DL
CALCIUM SERPL-MCNC: 9.6 MG/DL
CHLORIDE SERPL-SCNC: 107 MMOL/L
CO2 SERPL-SCNC: 19 MMOL/L
CREAT SERPL-MCNC: 0.9 MG/DL
EGFR: 78 ML/MIN/1.73M2
EOSINOPHIL # BLD AUTO: 0.09 K/UL
EOSINOPHIL NFR BLD AUTO: 1.3 %
GLUCOSE SERPL-MCNC: 83 MG/DL
HCT VFR BLD CALC: 38 %
HGB BLD-MCNC: 12.2 G/DL
IMM GRANULOCYTES NFR BLD AUTO: 0.1 %
LYMPHOCYTES # BLD AUTO: 3.21 K/UL
LYMPHOCYTES NFR BLD AUTO: 48 %
MAN DIFF?: NORMAL
MCHC RBC-ENTMCNC: 30.7 PG
MCHC RBC-ENTMCNC: 32.1 G/DL
MCV RBC AUTO: 95.5 FL
MONOCYTES # BLD AUTO: 0.51 K/UL
MONOCYTES NFR BLD AUTO: 7.6 %
NEUTROPHILS # BLD AUTO: 2.84 K/UL
NEUTROPHILS NFR BLD AUTO: 42.6 %
PLATELET # BLD AUTO: 275 K/UL
POTASSIUM SERPL-SCNC: 4.8 MMOL/L
PROT SERPL-MCNC: 6.9 G/DL
RBC # BLD: 3.98 M/UL
RBC # FLD: 13.2 %
SODIUM SERPL-SCNC: 140 MMOL/L
VIRAL LOAD INTERP: NOT DETECTED COPIES/ML
VIRAL LOAD LOG: NOT DETECTED LOGCOPIES/ML
WBC # FLD AUTO: 6.69 K/UL

## 2022-05-26 ENCOUNTER — OUTPATIENT (OUTPATIENT)
Dept: OUTPATIENT SERVICES | Facility: HOSPITAL | Age: 47
LOS: 1 days | Discharge: HOME | End: 2022-05-26

## 2022-05-26 ENCOUNTER — APPOINTMENT (OUTPATIENT)
Dept: INFECTIOUS DISEASE | Facility: CLINIC | Age: 47
End: 2022-05-26
Payer: COMMERCIAL

## 2022-05-26 DIAGNOSIS — U07.1 COVID-19: ICD-10-CM

## 2022-05-26 DIAGNOSIS — B20 HUMAN IMMUNODEFICIENCY VIRUS [HIV] DISEASE: ICD-10-CM

## 2022-05-26 PROCEDURE — ZZZZZ: CPT

## 2022-05-26 NOTE — HISTORY OF PRESENT ILLNESS
[FreeTextEntry1] : 48 yo F with PMH of HIV, Sciatica, HLD, Mood disorder, televisit for follow-up for recently diagnosed COVID.\par \par Her daughter recently with COVID. \par She started to have aches and chills on 5/16. \par She performed a home test and was found to be positive. \par Prescribed Paxlovid, however only able to take 2 days before having GI side effects. \par \par She reports feeling well today. Denies any worsening coughing, shortness of breath. \par Was recently tested again and was negative. \par No other complaints. \par \par \par

## 2022-05-26 NOTE — REASON FOR VISIT
[Home] : at home, [unfilled] , at the time of the visit. [Medical Office: ([facility])___] : at the medical office located in  [Verbal consent obtained from patient] : the patient, [unfilled] [Follow-Up: _____] : a [unfilled] follow-up visit [FreeTextEntry1] : COVID

## 2022-05-26 NOTE — ASSESSMENT
[FreeTextEntry1] : COVID- \par - recent infection 5/16 -- doing well now \par \par HIV - well controlled on Juluca\par - VL 5/9/22 UD\par - CD4 821 2/9/2022 \par - continue Juluca \par - next labs with Cd4  due in 8/2022\par \par \par ASCUS - Anal pap 10/2020\par - Anoscopy performed 10/5/2021 without suspicion lesions \par - needs repeat anal pap -- has follow-up with OB-gyn -- discussed with her to make appointment \par \par HTN - on enalparil 5 mg daily\par \par HLD -  atorvastatin 40 mg daily, controlled on 2/9/2022\par \par HME\par Pneumovax 2011, PCV13 3/23/2011, PPSV 4/14/2022\par Hep A 5/5/2011, 2/16/2012\par Hep B, 5/5/2011, 6/2/2011, 6/30/2014 -- Hep B Sab non reactive \par Hep C Ab negative\par Menacta 6/20/2019, 9/12/2019\par HPV 10/11/2018, 1/31/2019, 6/20/2019\par Completed Pfizer in 8/2021 with booster \par \par

## 2022-06-01 ENCOUNTER — APPOINTMENT (OUTPATIENT)
Dept: INFECTIOUS DISEASE | Facility: CLINIC | Age: 47
End: 2022-06-01

## 2022-06-03 ENCOUNTER — APPOINTMENT (OUTPATIENT)
Dept: OBGYN | Facility: CLINIC | Age: 47
End: 2022-06-03
Payer: COMMERCIAL

## 2022-06-03 ENCOUNTER — OUTPATIENT (OUTPATIENT)
Dept: OUTPATIENT SERVICES | Facility: HOSPITAL | Age: 47
LOS: 1 days | Discharge: HOME | End: 2022-06-03

## 2022-06-03 VITALS
SYSTOLIC BLOOD PRESSURE: 104 MMHG | DIASTOLIC BLOOD PRESSURE: 62 MMHG | WEIGHT: 166 LBS | HEIGHT: 67 IN | BODY MASS INDEX: 26.06 KG/M2

## 2022-06-03 DIAGNOSIS — Z01.419 ENCOUNTER FOR GYNECOLOGICAL EXAMINATION (GENERAL) (ROUTINE) W/OUT ABNORMAL FINDINGS: ICD-10-CM

## 2022-06-03 DIAGNOSIS — F17.200 NICOTINE DEPENDENCE, UNSPECIFIED, UNCOMPLICATED: ICD-10-CM

## 2022-06-03 DIAGNOSIS — N83.209 UNSPECIFIED OVARIAN CYST, UNSPECIFIED SIDE: ICD-10-CM

## 2022-06-03 DIAGNOSIS — Z87.891 PERSONAL HISTORY OF NICOTINE DEPENDENCE: ICD-10-CM

## 2022-06-03 PROCEDURE — 99396 PREV VISIT EST AGE 40-64: CPT

## 2022-06-03 NOTE — PHYSICAL EXAM
[Chaperone Present] : A chaperone was present in the examining room during all aspects of the physical examination [Appropriately responsive] : appropriately responsive [Alert] : alert [No Acute Distress] : no acute distress [No Lymphadenopathy] : no lymphadenopathy [Regular Rate Rhythm] : regular rate rhythm [No Murmurs] : no murmurs [Clear to Auscultation B/L] : clear to auscultation bilaterally [Soft] : soft [Non-tender] : non-tender [Non-distended] : non-distended [No HSM] : No HSM [No Lesions] : no lesions [No Mass] : no mass [Oriented x3] : oriented x3 [Examination Of The Breasts] : a normal appearance [No Masses] : no breast masses were palpable [Labia Majora] : normal [Labia Minora] : normal [Nabothian Cyst ___ cm] : [unfilled] ~Ucm nabothian cyst [Normal] : normal [Uterine Adnexae] : normal [FreeTextEntry5] : nabothian cyst visualized a 7:00 position, separate 1cm nabothian cyst palpated in the 2:00 position.  No other gross abnormalities

## 2022-06-03 NOTE — HISTORY OF PRESENT ILLNESS
[FreeTextEntry1] : 48yo P3 with LMP 5/10 presenting for annual exam.  Reports regular monthly menses.Currently sexually active with 1 partner, partner is also HIV positive and they use condoms.  Patient is s/p tubal ligation. Patient has a history of  left adnexal cyst that was 5cm in , normal Ca-125 at that time, noted to be likely benign simple cyst.  Size decreased to 2.5cm on sonogram in . Denies abnormal vaginal bleeding, vaginal discharge or pelvic pain. No other complaints.\par \par Obhx: \par FT  x2, preeclampsia with both pregnancies\par eTOP x1 with D&C\par \par Health maintenance:\par Pap smear - , anal pap ASCUS, cervical pap NILM\par Mammogram - last year, wnl per patient\par Colonoscopy - never\par

## 2022-06-07 LAB
C TRACH RRNA SPEC QL NAA+PROBE: NOT DETECTED
HPV HIGH+LOW RISK DNA PNL CVX: NOT DETECTED
N GONORRHOEA RRNA SPEC QL NAA+PROBE: NOT DETECTED
SOURCE AMPLIFICATION: NORMAL
SOURCE AMPLIFICATION: NORMAL
T VAGINALIS RRNA SPEC QL NAA+PROBE: NOT DETECTED

## 2022-06-08 ENCOUNTER — OUTPATIENT (OUTPATIENT)
Dept: OUTPATIENT SERVICES | Facility: HOSPITAL | Age: 47
LOS: 1 days | Discharge: HOME | End: 2022-06-08

## 2022-06-13 ENCOUNTER — APPOINTMENT (OUTPATIENT)
Dept: SURGERY | Facility: CLINIC | Age: 47
End: 2022-06-13

## 2022-06-15 ENCOUNTER — RESULT REVIEW (OUTPATIENT)
Age: 47
End: 2022-06-15

## 2022-06-15 ENCOUNTER — OUTPATIENT (OUTPATIENT)
Dept: OUTPATIENT SERVICES | Facility: HOSPITAL | Age: 47
LOS: 1 days | Discharge: HOME | End: 2022-06-15
Payer: COMMERCIAL

## 2022-06-15 DIAGNOSIS — Z12.31 ENCOUNTER FOR SCREENING MAMMOGRAM FOR MALIGNANT NEOPLASM OF BREAST: ICD-10-CM

## 2022-06-15 PROCEDURE — 77063 BREAST TOMOSYNTHESIS BI: CPT | Mod: 26

## 2022-06-15 PROCEDURE — 77067 SCR MAMMO BI INCL CAD: CPT | Mod: 26

## 2022-06-16 LAB
ANAL PAP CYTOLOGY: NORMAL
CYTOLOGY CVX/VAG DOC THIN PREP: NORMAL
HPV DNA, HIGH RISK, LOW RISK, ANAL: DETECTED

## 2022-06-27 ENCOUNTER — RESULT REVIEW (OUTPATIENT)
Age: 47
End: 2022-06-27

## 2022-06-27 ENCOUNTER — OUTPATIENT (OUTPATIENT)
Dept: OUTPATIENT SERVICES | Facility: HOSPITAL | Age: 47
LOS: 1 days | Discharge: HOME | End: 2022-06-27

## 2022-06-27 DIAGNOSIS — R10.2 PELVIC AND PERINEAL PAIN: ICD-10-CM

## 2022-06-27 DIAGNOSIS — N83.201 UNSPECIFIED OVARIAN CYST, RIGHT SIDE: ICD-10-CM

## 2022-06-27 DIAGNOSIS — N83.202 UNSPECIFIED OVARIAN CYST, LEFT SIDE: ICD-10-CM

## 2022-06-27 PROCEDURE — 76856 US EXAM PELVIC COMPLETE: CPT | Mod: 26

## 2022-06-27 PROCEDURE — 76830 TRANSVAGINAL US NON-OB: CPT | Mod: 26

## 2022-06-28 ENCOUNTER — RESULT REVIEW (OUTPATIENT)
Age: 47
End: 2022-06-28

## 2022-06-28 ENCOUNTER — OUTPATIENT (OUTPATIENT)
Dept: OUTPATIENT SERVICES | Facility: HOSPITAL | Age: 47
LOS: 1 days | Discharge: HOME | End: 2022-06-28

## 2022-06-28 DIAGNOSIS — R92.8 OTHER ABNORMAL AND INCONCLUSIVE FINDINGS ON DIAGNOSTIC IMAGING OF BREAST: ICD-10-CM

## 2022-06-28 PROCEDURE — G0279: CPT | Mod: 26

## 2022-06-28 PROCEDURE — 77065 DX MAMMO INCL CAD UNI: CPT | Mod: 26,LT

## 2022-06-28 PROCEDURE — 76642 ULTRASOUND BREAST LIMITED: CPT | Mod: 26,LT

## 2022-06-28 PROCEDURE — 77061 BREAST TOMOSYNTHESIS UNI: CPT | Mod: 26

## 2022-08-10 ENCOUNTER — APPOINTMENT (OUTPATIENT)
Dept: SURGERY | Facility: CLINIC | Age: 47
End: 2022-08-10

## 2022-08-10 VITALS
OXYGEN SATURATION: 98 % | SYSTOLIC BLOOD PRESSURE: 124 MMHG | DIASTOLIC BLOOD PRESSURE: 82 MMHG | WEIGHT: 161 LBS | HEART RATE: 93 BPM | BODY MASS INDEX: 25.27 KG/M2 | HEIGHT: 67 IN | TEMPERATURE: 97.3 F

## 2022-08-10 PROCEDURE — 99213 OFFICE O/P EST LOW 20 MIN: CPT

## 2022-08-10 PROCEDURE — 99072 ADDL SUPL MATRL&STAF TM PHE: CPT

## 2022-08-18 ENCOUNTER — OUTPATIENT (OUTPATIENT)
Dept: OUTPATIENT SERVICES | Facility: HOSPITAL | Age: 47
LOS: 1 days | Discharge: HOME | End: 2022-08-18

## 2022-08-18 ENCOUNTER — APPOINTMENT (OUTPATIENT)
Dept: INFECTIOUS DISEASE | Facility: CLINIC | Age: 47
End: 2022-08-18

## 2022-08-18 VITALS
TEMPERATURE: 98.4 F | RESPIRATION RATE: 16 BRPM | BODY MASS INDEX: 25.27 KG/M2 | SYSTOLIC BLOOD PRESSURE: 106 MMHG | HEART RATE: 92 BPM | WEIGHT: 161 LBS | HEIGHT: 67 IN | DIASTOLIC BLOOD PRESSURE: 70 MMHG

## 2022-08-18 DIAGNOSIS — B20 HUMAN IMMUNODEFICIENCY VIRUS [HIV] DISEASE: ICD-10-CM

## 2022-08-18 DIAGNOSIS — I10 ESSENTIAL (PRIMARY) HYPERTENSION: ICD-10-CM

## 2022-08-18 PROCEDURE — 99214 OFFICE O/P EST MOD 30 MIN: CPT

## 2022-08-18 RX ORDER — NIRMATRELVIR AND RITONAVIR 150-100 MG
10 X 150 MG & KIT ORAL
Qty: 1 | Refills: 0 | Status: DISCONTINUED | COMMUNITY
Start: 2022-05-17 | End: 2022-08-18

## 2022-08-18 RX ORDER — ERGOCALCIFEROL 1.25 MG/1
1.25 MG CAPSULE, LIQUID FILLED ORAL
Qty: 4 | Refills: 5 | Status: DISCONTINUED | COMMUNITY
Start: 1900-01-01 | End: 2022-08-18

## 2022-08-18 RX ORDER — DOCUSATE SODIUM 50 MG AND SENNOSIDES 8.6 MG 8.6; 5 MG/1; MG/1
8.6-5 TABLET, FILM COATED ORAL
Refills: 0 | Status: COMPLETED | COMMUNITY
End: 2022-08-18

## 2022-08-18 NOTE — PROCEDURE
[FreeTextEntry1] : JOSE and anoscopy show normal sphincter tone, normal internal hemorrhoids and no masses.\par

## 2022-08-18 NOTE — ASSESSMENT
[FreeTextEntry1] : HIV - well controlled on Juluca\par - VL 5/9/22 UD\par - CD4 821 2/9/2022 \par - continue Juluca \par - repeat labs\par \par ASCUS - Anal pap 10/2020\par - Anoscopy performed 10/5/2021 without suspicion lesions \par - ASCUS 6/3/2022 -- seen by surgery on 8/10/2022 -- recommended yearly follow-up \par \par HTN - on enalparil 5 mg daily, controlled \par \par HLD - atorvastatin 40 mg daily, controlled on 2/9/2022\par \par HME\par Pneumovax 2011, PCV13 3/23/2011, PPSV 4/14/2022\par Hep A 5/5/2011, 2/16/2012\par Hep B, 5/5/2011, 6/2/2011, 6/30/2014 -- Hep B Sab non reactive \par Hep C Ab negative\par Menacta 6/20/2019, 9/12/2019\par HPV 10/11/2018, 1/31/2019, 6/20/2019\par Completed Pfizer in 8/2021 with booster \par \par rtc in 3 months \par \par .

## 2022-08-18 NOTE — PHYSICAL EXAM
[General Appearance - Alert] : alert [General Appearance - In No Acute Distress] : in no acute distress [Sclera] : the sclera and conjunctiva were normal [Outer Ear] : the ears and nose were normal in appearance [Both Tympanic Membranes Were Examined] : both tympanic membranes were normal [Respiration, Rhythm And Depth] : normal respiratory rhythm and effort [Auscultation Breath Sounds / Voice Sounds] : lungs were clear to auscultation bilaterally [Heart Sounds] : normal S1 and S2 [Full Pulse] : the pedal pulses are present [Edema] : there was no peripheral edema [Abdomen Tenderness] : non-tender [] : no rash [Cranial Nerves] : cranial nerves 2-12 were intact

## 2022-08-18 NOTE — HISTORY OF PRESENT ILLNESS
[FreeTextEntry1] : Patient is a 47F with PMH COPD, HIV, anal dysplasia who presents for follow up. Patient had an anal pap in 6/2022 that showed ASCUS.  Previous pap in 2020 also showed ASCUS. Patient denies any significant symptoms. Patient denies fevers, chills, nausea, vomiting, abdominal pain, constipation, diarrhea, blood in the stool or unexpected weight loss.  Patient denies a family history of colon cancer rectal cancer or inflammatory bowel disease.

## 2022-08-18 NOTE — HISTORY OF PRESENT ILLNESS
[FreeTextEntry1] : 48 yo F with PMH of HIV, Sciatica, HLD, Mood disorder, televisit for follow-up for HIV follow-up. \par \par Reviewed consultants notes. \par No complaints and feels well otherwise. \par \par \par

## 2022-08-18 NOTE — PHYSICAL EXAM
[Excoriation] : no perianal excoriation [Fistula] : no fistulas [Wart] : no warts [Ulcer ___ cm] : no ulcers [Pilonidal Cyst] : no pilonidal cysts [Tender, Swollen] : nontender, non-swollen [Thrombosed] : that was not thrombosed [Skin Tags] : there were no residual hemorrhoidal skin tags seen [Normal] : was normal [None] : there was no rectal mass  [Respiratory Effort] : normal respiratory effort [Normal Rate and Rhythm] : normal rate and rhythm [de-identified] : normal external exam [de-identified] : awake, alert and in no acute distress

## 2022-08-25 ENCOUNTER — APPOINTMENT (OUTPATIENT)
Dept: INFECTIOUS DISEASE | Facility: CLINIC | Age: 47
End: 2022-08-25

## 2022-09-01 LAB
ANION GAP SERPL CALC-SCNC: 10 MMOL/L
BASOPHILS # BLD AUTO: 0.03 K/UL
BASOPHILS NFR BLD AUTO: 0.6 %
BUN SERPL-MCNC: 7 MG/DL
CALCIUM SERPL-MCNC: 9.1 MG/DL
CD16+CD56+ CELLS # BLD: 271 /UL
CD16+CD56+ CELLS NFR BLD: 11 %
CD19 CELLS NFR BLD: 727 /UL
CD3 CELLS # BLD: 1394 /UL
CD3 CELLS NFR BLD: 58 %
CD3+CD4+ CELLS # BLD: 736 /UL
CD3+CD4+ CELLS NFR BLD: 30 %
CD3+CD4+ CELLS/CD3+CD8+ CLL SPEC: 1.1 RATIO
CD3+CD8+ CELLS # SPEC: 671 /UL
CD3+CD8+ CELLS NFR BLD: 28 %
CELLS.CD3-CD19+/CELLS IN BLOOD: 30 %
CHLORIDE SERPL-SCNC: 106 MMOL/L
CO2 SERPL-SCNC: 22 MMOL/L
CREAT SERPL-MCNC: 0.9 MG/DL
EGFR: 78 ML/MIN/1.73M2
EOSINOPHIL # BLD AUTO: 0.07 K/UL
EOSINOPHIL NFR BLD AUTO: 1.3 %
GLUCOSE SERPL-MCNC: 102 MG/DL
HCT VFR BLD CALC: 38.2 %
HGB BLD-MCNC: 12.3 G/DL
IMM GRANULOCYTES NFR BLD AUTO: 0.2 %
LYMPHOCYTES # BLD AUTO: 2.26 K/UL
LYMPHOCYTES NFR BLD AUTO: 42.5 %
MAN DIFF?: NORMAL
MCHC RBC-ENTMCNC: 30.1 PG
MCHC RBC-ENTMCNC: 32.2 G/DL
MCV RBC AUTO: 93.6 FL
MONOCYTES # BLD AUTO: 0.49 K/UL
MONOCYTES NFR BLD AUTO: 9.2 %
NEUTROPHILS # BLD AUTO: 2.46 K/UL
NEUTROPHILS NFR BLD AUTO: 46.2 %
PLATELET # BLD AUTO: 274 K/UL
POTASSIUM SERPL-SCNC: 4.6 MMOL/L
RBC # BLD: 4.08 M/UL
RBC # FLD: 13.1 %
SODIUM SERPL-SCNC: 138 MMOL/L
VIRAL LOAD INTERP: NOT DETECTED COPIES/ML
VIRAL LOAD LOG: NOT DETECTED LOGCOPIES/ML
WBC # FLD AUTO: 5.32 K/UL

## 2022-12-15 ENCOUNTER — APPOINTMENT (OUTPATIENT)
Dept: INFECTIOUS DISEASE | Facility: CLINIC | Age: 47
End: 2022-12-15

## 2022-12-15 ENCOUNTER — OUTPATIENT (OUTPATIENT)
Dept: OUTPATIENT SERVICES | Facility: HOSPITAL | Age: 47
LOS: 1 days | Discharge: HOME | End: 2022-12-15

## 2022-12-15 DIAGNOSIS — B20 HUMAN IMMUNODEFICIENCY VIRUS [HIV] DISEASE: ICD-10-CM

## 2022-12-15 PROCEDURE — ZZZZZ: CPT

## 2022-12-15 NOTE — ASSESSMENT
[FreeTextEntry1] : HIV - \par - well controlled \par - continue Juluca \par - repeat labs\par \par ASCUS - Anal pap 10/2020\par - Anoscopy performed 10/5/2021 without suspicion lesions \par - ASCUS 6/3/2022 -- seen by surgery on 8/10/2022 -- recommended yearly follow-up \par \par HTN - on enalparil 5 mg daily\par \par HLD - atorvastatin 40 mg daily, controlled on 2/9/2022\par \par HME\par Pneumovax 2011, PCV13 3/23/2011, PPSV 4/14/2022\par Hep A 5/5/2011, 2/16/2012\par Hep B, 5/5/2011, 6/2/2011, 6/30/2014 -- Hep B Sab non reactive \par Hep C Ab negative\par Menacta 6/20/2019, 9/12/2019\par HPV 10/11/2018, 1/31/2019, 6/20/2019\par Flu 11/2022 \par Completed Pfizer in 8/2021 with booster in 11/2022\par \par rtc in 3 months \par

## 2022-12-15 NOTE — HISTORY OF PRESENT ILLNESS
[Home] : at home, [unfilled] , at the time of the visit. [Medical Office: ([facility])___] : at the medical office located in  [Verbal consent obtained from patient] : the patient, [unfilled] [FreeTextEntry1] : 48 yo F with PMH of HIV, Sciatica, HLD, Mood disorder, televisit for follow-up for HIV follow-up. \par \par No complaints and feels well otherwise.\par Received flu shot and COVID vaccine last month. \par

## 2022-12-28 LAB
ALBUMIN SERPL ELPH-MCNC: 4.2 G/DL
ALP BLD-CCNC: 93 U/L
ALT SERPL-CCNC: 13 U/L
ANION GAP SERPL CALC-SCNC: 12 MMOL/L
AST SERPL-CCNC: 16 U/L
BASOPHILS # BLD AUTO: 0.03 K/UL
BASOPHILS NFR BLD AUTO: 0.5 %
BILIRUB SERPL-MCNC: 0.2 MG/DL
BUN SERPL-MCNC: 7 MG/DL
CALCIUM SERPL-MCNC: 9.1 MG/DL
CD16+CD56+ CELLS # BLD: 235 /UL
CD16+CD56+ CELLS NFR BLD: 10 %
CD19 CELLS NFR BLD: 699 /UL
CD3 CELLS # BLD: 1316 /UL
CD3 CELLS NFR BLD: 58 %
CD3+CD4+ CELLS # BLD: 632 /UL
CD3+CD4+ CELLS NFR BLD: 28 %
CD3+CD4+ CELLS/CD3+CD8+ CLL SPEC: 0.91 RATIO
CD3+CD8+ CELLS # SPEC: 695 /UL
CD3+CD8+ CELLS NFR BLD: 31 %
CELLS.CD3-CD19+/CELLS IN BLOOD: 31 %
CHLORIDE SERPL-SCNC: 107 MMOL/L
CO2 SERPL-SCNC: 20 MMOL/L
CREAT SERPL-MCNC: 0.9 MG/DL
EGFR: 78 ML/MIN/1.73M2
EOSINOPHIL # BLD AUTO: 0.1 K/UL
EOSINOPHIL NFR BLD AUTO: 1.6 %
GLUCOSE SERPL-MCNC: 104 MG/DL
HCT VFR BLD CALC: 36.5 %
HGB BLD-MCNC: 12 G/DL
IMM GRANULOCYTES NFR BLD AUTO: 0.5 %
LYMPHOCYTES # BLD AUTO: 2.06 K/UL
LYMPHOCYTES NFR BLD AUTO: 33.6 %
MAN DIFF?: NORMAL
MCHC RBC-ENTMCNC: 30.8 PG
MCHC RBC-ENTMCNC: 32.9 G/DL
MCV RBC AUTO: 93.8 FL
MONOCYTES # BLD AUTO: 0.39 K/UL
MONOCYTES NFR BLD AUTO: 6.4 %
NEUTROPHILS # BLD AUTO: 3.53 K/UL
NEUTROPHILS NFR BLD AUTO: 57.4 %
PLATELET # BLD AUTO: 298 K/UL
POTASSIUM SERPL-SCNC: 4.3 MMOL/L
PROT SERPL-MCNC: 6.6 G/DL
RBC # BLD: 3.89 M/UL
RBC # FLD: 13 %
SODIUM SERPL-SCNC: 139 MMOL/L
VIRAL LOAD INTERP: NOT DETECTED COPIES/ML
VIRAL LOAD LOG: NOT DETECTED LOGCOPIES/ML
WBC # FLD AUTO: 6.14 K/UL

## 2023-02-23 ENCOUNTER — EMERGENCY (EMERGENCY)
Facility: HOSPITAL | Age: 48
LOS: 0 days | Discharge: HOME | End: 2023-02-23
Attending: STUDENT IN AN ORGANIZED HEALTH CARE EDUCATION/TRAINING PROGRAM
Payer: COMMERCIAL

## 2023-02-23 VITALS
HEART RATE: 82 BPM | DIASTOLIC BLOOD PRESSURE: 78 MMHG | OXYGEN SATURATION: 98 % | SYSTOLIC BLOOD PRESSURE: 142 MMHG | RESPIRATION RATE: 18 BRPM

## 2023-02-23 VITALS
RESPIRATION RATE: 16 BRPM | TEMPERATURE: 96 F | HEART RATE: 105 BPM | WEIGHT: 160.06 LBS | OXYGEN SATURATION: 94 % | SYSTOLIC BLOOD PRESSURE: 156 MMHG | DIASTOLIC BLOOD PRESSURE: 63 MMHG

## 2023-02-23 DIAGNOSIS — B20 HUMAN IMMUNODEFICIENCY VIRUS [HIV] DISEASE: ICD-10-CM

## 2023-02-23 DIAGNOSIS — Z88.8 ALLERGY STATUS TO OTHER DRUGS, MEDICAMENTS AND BIOLOGICAL SUBSTANCES: ICD-10-CM

## 2023-02-23 DIAGNOSIS — Z23 ENCOUNTER FOR IMMUNIZATION: ICD-10-CM

## 2023-02-23 DIAGNOSIS — S09.90XA UNSPECIFIED INJURY OF HEAD, INITIAL ENCOUNTER: ICD-10-CM
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
## 2023-02-23 DIAGNOSIS — F31.9 BIPOLAR DISORDER, UNSPECIFIED: ICD-10-CM

## 2023-02-23 DIAGNOSIS — S01.01XA LACERATION WITHOUT FOREIGN BODY OF SCALP, INITIAL ENCOUNTER: ICD-10-CM
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
## 2023-02-23 DIAGNOSIS — Z87.891 PERSONAL HISTORY OF NICOTINE DEPENDENCE: ICD-10-CM

## 2023-02-23 PROCEDURE — 99284 EMERGENCY DEPT VISIT MOD MDM: CPT | Mod: 25

## 2023-02-23 PROCEDURE — 70450 CT HEAD/BRAIN W/O DYE: CPT | Mod: 26,MA

## 2023-02-23 PROCEDURE — 90715 TDAP VACCINE 7 YRS/> IM: CPT

## 2023-02-23 PROCEDURE — 12001 RPR S/N/AX/GEN/TRNK 2.5CM/<: CPT

## 2023-02-23 PROCEDURE — 90471 IMMUNIZATION ADMIN: CPT

## 2023-02-23 PROCEDURE — 70450 CT HEAD/BRAIN W/O DYE: CPT | Mod: MA

## 2023-02-23 RX ORDER — TETANUS TOXOID, REDUCED DIPHTHERIA TOXOID AND ACELLULAR PERTUSSIS VACCINE, ADSORBED 5; 2.5; 8; 8; 2.5 [IU]/.5ML; [IU]/.5ML; UG/.5ML; UG/.5ML; UG/.5ML
0.5 SUSPENSION INTRAMUSCULAR ONCE
Refills: 0 | Status: COMPLETED | OUTPATIENT
Start: 2023-02-23 | End: 2023-02-23

## 2023-02-23 RX ORDER — ACETAMINOPHEN 500 MG
650 TABLET ORAL ONCE
Refills: 0 | Status: COMPLETED | OUTPATIENT
Start: 2023-02-23 | End: 2023-02-23

## 2023-02-23 RX ADMIN — TETANUS TOXOID, REDUCED DIPHTHERIA TOXOID AND ACELLULAR PERTUSSIS VACCINE, ADSORBED 0.5 MILLILITER(S): 5; 2.5; 8; 8; 2.5 SUSPENSION INTRAMUSCULAR at 11:04

## 2023-02-23 RX ADMIN — Medication 650 MILLIGRAM(S): at 11:04

## 2023-02-23 NOTE — ED PROVIDER NOTE - OBJECTIVE STATEMENT
49 y/o F PMHx bipolar disorder, HIV (undetectable last viral load) presents to ED s/p mechanical trip and fall this morning. Pt reports tripping over her dogs gate, +HT into the door. No LOC. Pt reports bleeding from head controlled with pressure. Reports mild pain to right upper head at this time where injury occurred.

## 2023-02-23 NOTE — ED PROVIDER NOTE - PHYSICAL EXAMINATION
Vital Signs: I have reviewed the initial vital signs.  Constitutional: Patient in no acute distress.  Integumentary: +1.5cm linear laceration to R upper parietal scalp   ENT: MMM  NECK: Supple. no Midline ttp or stepoffs, full ROM.   Cardiovascular: RRR, radial pulses 2/4 bilaterally.   Respiratory: Breath sounds CTA b/l, no wheezing or crackles, good air exchange, good resp effort and excursion, no accessory muscle use, no stridor.  Gastrointestinal: Abdomen soft, non-tender, non-distended, no rebound tenderness or guarding, no CVAT.   Musculoskeletal: FROM, no edema, no calf pain/swelling/erythema.  Neurologic: AAOx3, motor 5/5 and sensation intact throughout upper and lower ext, CN II-XII intact, No facial droop or slurring of speech. No focal deficits.

## 2023-02-23 NOTE — ED PROVIDER NOTE - CLINICAL SUMMARY MEDICAL DECISION MAKING FREE TEXT BOX
49 yo F presented s/p mechanical fall sustaining 1.5cm laceration to R scalp. No LOC. No A/C. Nonfocal neurologic exam. CT imaging ordered and reviewed no signs of intracranial pathology. 2 staples placed to scalp. tdap updated. Pt instructed to return in 7 days for staple removal. Patient to be discharged from ED. Any available test results were discussed with patient and/or family. Verbal instructions given, including instructions to return to ED immediately for any new, worsening, or concerning symptoms. Patient endorsed understanding. Written discharge instructions additionally given, including follow-up plan.

## 2023-02-23 NOTE — ED PROVIDER NOTE - NS ED ROS FT
No fever, nausea, vomiting, chest pain, sob, palpitations, diaphoresis, cough, dizziness, numbness/tingling, neck pain/stiffness, abdominal pain, diarrhea, weakness, edema, or calf pain.

## 2023-02-23 NOTE — ED ADULT NURSE NOTE - OBJECTIVE STATEMENT
Pt presents to the ED s/p fall after tripping over dog gate. Pt reports no LOC or on any blood thinners
Hypertension, unspecified type

## 2023-02-23 NOTE — ED PROVIDER NOTE - CARE PLAN
Principal Discharge DX:	Closed head injury  Assessment and plan of treatment:	Plan - Tdap, Meds, CTH, staples   1 Principal Discharge DX:	Closed head injury  Assessment and plan of treatment:	Plan - Tdap, Meds, CTH, staples  Secondary Diagnosis:	Scalp laceration

## 2023-02-23 NOTE — ED PROVIDER NOTE - NSFOLLOWUPINSTRUCTIONS_ED_ALL_ED_FT
Closed Head Injury    Closed head injury in an injury to your head that may or may not involve a traumatic brain injury (TBI). Symptoms of TBI can be short or long lasting and include headache, dizziness, interference with memory or speech, fatigue, confusion, changes in sleep, mood changes, nausea, depression/anxiety, and dulling of senses. Make sure to obtain proper rest which includes getting plenty of sleep, avoiding excessive visual stimulation, and avoiding activities that may cause physical or mental stress. Avoid any situation where there is potential for another head injury including sports.    SEEK MEDICAL CARE IF YOU HAVE THE FOLLOWING SYMPTOMS: unusual drowsiness, vomiting, severe dizziness, seizures, lightheadedness, muscular weakness, different pupil sizes, visual changes, or clear or bloody discharge from your ears or nose. PLEASE RETURN IN 7 DAYS FOR REMOVAL OF 2 STAPLES.       Laceration    A laceration is a cut that goes through all of the layers of the skin and into the tissue that is right under the skin. Some lacerations heal on their own. Others need to be closed with stitches (sutures), staples, skin adhesive strips, or skin glue. Proper laceration care minimizes the risk of infection and helps the laceration to heal better.     SEEK IMMEDIATE MEDICAL CARE IF YOU HAVE THE FOLLOWING SYMPTOMS: swelling around the wound, worsening pain, drainage from the wound, red streaking going away from your wound, inability to move finger or toe near the laceration, or discoloration of skin near the laceration.           Closed Head Injury    Closed head injury in an injury to your head that may or may not involve a traumatic brain injury (TBI). Symptoms of TBI can be short or long lasting and include headache, dizziness, interference with memory or speech, fatigue, confusion, changes in sleep, mood changes, nausea, depression/anxiety, and dulling of senses. Make sure to obtain proper rest which includes getting plenty of sleep, avoiding excessive visual stimulation, and avoiding activities that may cause physical or mental stress. Avoid any situation where there is potential for another head injury including sports.    SEEK MEDICAL CARE IF YOU HAVE THE FOLLOWING SYMPTOMS: unusual drowsiness, vomiting, severe dizziness, seizures, lightheadedness, muscular weakness, different pupil sizes, visual changes, or clear or bloody discharge from your ears or nose.

## 2023-02-23 NOTE — ED PROVIDER NOTE - PATIENT PORTAL LINK FT
You can access the FollowMyHealth Patient Portal offered by [facility] by registering at the following website: [email or web address]. By joining [facility]’s FollowMyHealth portal, you will also be able to view your health information using other applications (apps) compatible with our system.

## 2023-03-02 ENCOUNTER — EMERGENCY (EMERGENCY)
Facility: HOSPITAL | Age: 48
LOS: 0 days | Discharge: HOME | End: 2023-03-02
Attending: EMERGENCY MEDICINE
Payer: COMMERCIAL

## 2023-03-02 VITALS
DIASTOLIC BLOOD PRESSURE: 59 MMHG | HEART RATE: 92 BPM | TEMPERATURE: 98 F | RESPIRATION RATE: 19 BRPM | HEIGHT: 67 IN | SYSTOLIC BLOOD PRESSURE: 114 MMHG | OXYGEN SATURATION: 99 % | WEIGHT: 160.06 LBS
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
## 2023-03-02 DIAGNOSIS — S01.91XD LACERATION WITHOUT FOREIGN BODY OF UNSPECIFIED PART OF HEAD, SUBSEQUENT ENCOUNTER: ICD-10-CM

## 2023-03-02 PROCEDURE — 99212 OFFICE O/P EST SF 10 MIN: CPT

## 2023-03-02 PROCEDURE — L9995: CPT

## 2023-03-02 NOTE — ED PROVIDER NOTE - OBJECTIVE STATEMENT
[age] y/o F PMHx bipolar disorder, HIV (undetectable last viral load) presents to ED for staple removal. pt received 2 staples 7d ago. denies n/v/f/sob/cp/back pain/abd pain

## 2023-03-02 NOTE — ED ADULT TRIAGE NOTE - HEART RATE (BEATS/MIN)
Visit Vitals    /78 (BP 1 Location: Left arm, BP Patient Position: Sitting)    Pulse 52    Resp 16    Ht 5' 8\" (1.727 m)    Wt 162 lb (73.5 kg)    SpO2 97%    BMI 24.63 kg/m2 92

## 2023-03-02 NOTE — ED PROVIDER NOTE - PHYSICAL EXAMINATION
CONSTITUTIONAL: nontoxic appearing, in no acute distress  HEAD:  normocephalic, 2 staples L frontaltemporal region   EYES:  no conjunctival injection, no eye discharge, tracking well  ENT:  tympanic membranes intact bilaterally, moist mucous membranes, no oropharyngeal ulcerations or lesions, no tonsillar swelling or erythema, no tonsillar exudates  NECK:  supple, no masses, no tender anterior/posterior cervical lymphadenopathy  CV:  regular rate and rhythm, cap refill < 2 seconds  RESP:  normal respiratory effort, lungs clear to auscultation bilaterally, no wheezes, no crackles, no retractions, no stridor  ABD:  soft, nontender, nondistended, no masses, no organomegaly  LYMPH:  no significant lymphadenopathy  MSK/NEURO: nl rom and tone;   SKIN:  warm, dry, no rash

## 2023-03-02 NOTE — ED PROVIDER NOTE - CARE PROVIDER_API CALL
Chandler Talbot)  Internal Medicine  [address] [place] [id number]  Phone: [phone]  Fax: [phone]  Follow Up Time: Routine

## 2023-03-02 NOTE — ED PROVIDER NOTE - PATIENT PORTAL LINK FT
You can access the FollowMyHealth Patient Portal offered by [facility] by registering at the following website: [email or web address]. By joining [facility]’s FollowMyHealth portal, you will also be able to view your health information using other applications (apps) compatible with our system.

## 2023-03-02 NOTE — ED PROVIDER NOTE - ATTENDING CONTRIBUTION TO CARE
I personally evaluated the patient. I reviewed the Resident´s or Physician Assistant´s note (as assigned above), and agree with the findings and plan except as documented in my note.    48-year-old female presents to the emergency department for suture removal after close head injury with wound management 1 week ago.  No new symptoms.  Has HIV disease at baseline but no consequential infections reported.  The review of systems is otherwise unremarkable    GENERAL: female in no distress.   HEENT: No bruising noted EOMI wound site clean and dry no cellulitis   CHEST: normal work of breathing noted.       Impression: Suture removal  Plan: Wound management, outpatient therapy

## 2023-03-02 NOTE — ED PROVIDER NOTE - NSFOLLOWUPINSTRUCTIONS_ED_ALL_ED_FT
DISCHARGE INSTRUCTIONS  - Please follow up with your doctor as needed  - Return to ED if you notice worsening vomiting, develop diarrhea, develop fevers, signs of respiratory distress, decreased oral intake, decreased wet diapers or any other concerning symptoms

## 2023-03-09 ENCOUNTER — APPOINTMENT (OUTPATIENT)
Dept: INFECTIOUS DISEASE | Facility: CLINIC | Age: 48
End: 2023-03-09

## 2023-03-09 ENCOUNTER — NON-APPOINTMENT (OUTPATIENT)
Age: 48
End: 2023-03-09

## 2023-03-09 ENCOUNTER — OUTPATIENT (OUTPATIENT)
Dept: OUTPATIENT SERVICES | Facility: HOSPITAL | Age: 48
LOS: 1 days | End: 2023-03-09
Payer: COMMERCIAL

## 2023-03-09 DIAGNOSIS — B20 HUMAN IMMUNODEFICIENCY VIRUS [HIV] DISEASE: ICD-10-CM

## 2023-03-09 PROCEDURE — G9012: CPT

## 2023-04-12 ENCOUNTER — OUTPATIENT (OUTPATIENT)
Dept: OUTPATIENT SERVICES | Facility: HOSPITAL | Age: 48
LOS: 1 days | End: 2023-04-12
Payer: COMMERCIAL

## 2023-04-12 DIAGNOSIS — K01.1 IMPACTED TEETH: ICD-10-CM

## 2023-04-12 PROCEDURE — D7140: CPT

## 2023-04-17 DIAGNOSIS — K05.6 PERIODONTAL DISEASE, UNSPECIFIED: ICD-10-CM

## 2023-04-19 ENCOUNTER — OUTPATIENT (OUTPATIENT)
Dept: OUTPATIENT SERVICES | Facility: HOSPITAL | Age: 48
LOS: 1 days | End: 2023-04-19
Payer: COMMERCIAL

## 2023-04-19 DIAGNOSIS — K01.1 IMPACTED TEETH: ICD-10-CM

## 2023-04-19 PROCEDURE — G9012: CPT

## 2023-04-19 PROCEDURE — D7140: CPT

## 2023-04-20 ENCOUNTER — APPOINTMENT (OUTPATIENT)
Dept: INFECTIOUS DISEASE | Facility: CLINIC | Age: 48
End: 2023-04-20
Payer: COMMERCIAL

## 2023-04-20 ENCOUNTER — OUTPATIENT (OUTPATIENT)
Dept: OUTPATIENT SERVICES | Facility: HOSPITAL | Age: 48
LOS: 1 days | End: 2023-04-20
Payer: COMMERCIAL

## 2023-04-20 DIAGNOSIS — K02.9 DENTAL CARIES, UNSPECIFIED: ICD-10-CM

## 2023-04-20 DIAGNOSIS — B20 HUMAN IMMUNODEFICIENCY VIRUS [HIV] DISEASE: ICD-10-CM

## 2023-04-20 DIAGNOSIS — E78.5 HYPERLIPIDEMIA, UNSPECIFIED: ICD-10-CM

## 2023-04-20 PROCEDURE — ZZZZZ: CPT

## 2023-04-20 PROCEDURE — 99213 OFFICE O/P EST LOW 20 MIN: CPT

## 2023-04-20 NOTE — HISTORY OF PRESENT ILLNESS
[FreeTextEntry1] : 47 yo F with PMH of HIV, Sciatica, HLD, Mood disorder, televisit for follow-up for HIV follow-up. \par \par No complaints and feels well otherwise.\par

## 2023-04-20 NOTE — ASSESSMENT
[FreeTextEntry1] : HIV - \par - well controlled \par - continue Juluca \par - check annual labs \par - brought up Colon Cancer screening and Lung Cancer screening -- will discuss further during next inperson visit \par \par ASCUS - Anal pap 10/2020\par - Anoscopy performed 10/5/2021 without suspicion lesions \par - ASCUS 6/3/2022 -- seen by surgery on 8/10/2022 -- recommended yearly follow-up \par \par HTN - on enalparil 5 mg daily\par \par HLD - atorvastatin 40 mg daily, controlled on 2/9/2022\par \par Previous Smoker - quit \par - Age [age] -- 1/2 ppd \par \par HME\par Pneumovax 2011, PCV13 3/23/2011, PPSV 4/14/2022\par Hep A 5/5/2011, 2/16/2012\par Hep B, 5/5/2011, 6/2/2011, 6/30/2014 -- Hep B Sab non reactive \par Hep C Ab negative\par Menacta 6/20/2019, 9/12/2019\par HPV 10/11/2018, 1/31/2019, 6/20/2019\par Flu 11/2022 \par Completed Pfizer in 8/2021 with booster in 11/2022\par \par

## 2023-04-20 NOTE — REASON FOR VISIT
[Home] : at home, [unfilled] , at the time of the educational consult. [Medical Office: ([facility])___] : at the medical office located in  [Follow-Up: _____] : a [unfilled] follow-up visit

## 2023-04-24 ENCOUNTER — LABORATORY RESULT (OUTPATIENT)
Age: 48
End: 2023-04-24

## 2023-05-07 LAB
ALBUMIN SERPL ELPH-MCNC: 4.1 G/DL
ALP BLD-CCNC: 83 U/L
ALT SERPL-CCNC: 10 U/L
ANION GAP SERPL CALC-SCNC: 11 MMOL/L
APPEARANCE: CLEAR
AST SERPL-CCNC: 16 U/L
BACTERIA: NEGATIVE /HPF
BASOPHILS # BLD AUTO: 0.03 K/UL
BASOPHILS NFR BLD AUTO: 0.5 %
BILIRUB SERPL-MCNC: <0.2 MG/DL
BILIRUBIN URINE: NEGATIVE
BLOOD URINE: ABNORMAL
BUN SERPL-MCNC: 10 MG/DL
C TRACH RRNA SPEC QL NAA+PROBE: NOT DETECTED
CALCIUM SERPL-MCNC: 9.1 MG/DL
CAST: 0 /LPF
CD16+CD56+ CELLS # BLD: 454 /UL
CD16+CD56+ CELLS NFR BLD: 12 %
CD19 CELLS NFR BLD: 1113 /UL
CD3 CELLS # BLD: 2055 /UL
CD3 CELLS NFR BLD: 56 %
CD3+CD4+ CELLS # BLD: 1023 /UL
CD3+CD4+ CELLS NFR BLD: 28 %
CD3+CD4+ CELLS/CD3+CD8+ CLL SPEC: 0.98 RATIO
CD3+CD8+ CELLS # SPEC: 1040 /UL
CD3+CD8+ CELLS NFR BLD: 28 %
CELLS.CD3-CD19+/CELLS IN BLOOD: 30 %
CHLORIDE SERPL-SCNC: 107 MMOL/L
CHOLEST SERPL-MCNC: 121 MG/DL
CO2 SERPL-SCNC: 20 MMOL/L
COLOR: YELLOW
CREAT SERPL-MCNC: 0.9 MG/DL
EGFR: 78 ML/MIN/1.73M2
EOSINOPHIL # BLD AUTO: 0.14 K/UL
EOSINOPHIL NFR BLD AUTO: 2.1 %
EPITHELIAL CELLS: 4 /HPF
ESTIMATED AVERAGE GLUCOSE: 111 MG/DL
GLUCOSE QUALITATIVE U: NEGATIVE MG/DL
GLUCOSE SERPL-MCNC: 112 MG/DL
HBA1C MFR BLD HPLC: 5.5 %
HBV SURFACE AB SERPL IA-ACNC: <3 MIU/ML
HBV SURFACE AG SER QL: NONREACTIVE
HCT VFR BLD CALC: 36.9 %
HCV AB SER QL: NONREACTIVE
HCV S/CO RATIO: 0.07 S/CO
HDLC SERPL-MCNC: 50 MG/DL
HEPATITIS A IGG ANTIBODY: REACTIVE
HGB BLD-MCNC: 12.1 G/DL
HIV1 RNA # SERPL NAA+PROBE: <20 COPIES/ML
IMM GRANULOCYTES NFR BLD AUTO: 0.3 %
KETONES URINE: NEGATIVE MG/DL
LDLC SERPL CALC-MCNC: 51 MG/DL
LEUKOCYTE ESTERASE URINE: ABNORMAL
LYMPHOCYTES # BLD AUTO: 3.19 K/UL
LYMPHOCYTES NFR BLD AUTO: 48.1 %
M TB IFN-G BLD-IMP: NEGATIVE
MAN DIFF?: NORMAL
MCHC RBC-ENTMCNC: 30.3 PG
MCHC RBC-ENTMCNC: 32.8 G/DL
MCV RBC AUTO: 92.5 FL
MICROSCOPIC-UA: NORMAL
MONOCYTES # BLD AUTO: 0.52 K/UL
MONOCYTES NFR BLD AUTO: 7.8 %
N GONORRHOEA RRNA SPEC QL NAA+PROBE: NOT DETECTED
NEUTROPHILS # BLD AUTO: 2.73 K/UL
NEUTROPHILS NFR BLD AUTO: 41.2 %
NITRITE URINE: NEGATIVE
NONHDLC SERPL-MCNC: 71 MG/DL
PH URINE: 6
PLATELET # BLD AUTO: 304 K/UL
POTASSIUM SERPL-SCNC: 4.3 MMOL/L
PROT SERPL-MCNC: 6.4 G/DL
PROTEIN URINE: NORMAL MG/DL
QUANTIFERON TB PLUS MITOGEN MINUS NIL: 4.1 IU/ML
QUANTIFERON TB PLUS NIL: 0.03 IU/ML
QUANTIFERON TB PLUS TB1 MINUS NIL: 0 IU/ML
QUANTIFERON TB PLUS TB2 MINUS NIL: 0.01 IU/ML
RBC # BLD: 3.99 M/UL
RBC # FLD: 12.6 %
RED BLOOD CELLS URINE: 2 /HPF
REVIEW: NORMAL
SIUH URINE DRUG SCREEN 1: NORMAL
SODIUM SERPL-SCNC: 138 MMOL/L
SOURCE AMPLIFICATION: NORMAL
SPECIFIC GRAVITY URINE: 1.01
T PALLIDUM AB SER QL IA: NEGATIVE
TRIGL SERPL-MCNC: 98 MG/DL
UROBILINOGEN URINE: 0.2 MG/DL
VIRAL LOAD INTERP: DETECTED COPIES/ML
VIRAL LOAD LOG: <1.3 LOGCOPIES/ML
WBC # FLD AUTO: 6.63 K/UL
WHITE BLOOD CELLS URINE: 5 /HPF

## 2023-05-26 ENCOUNTER — OUTPATIENT (OUTPATIENT)
Dept: OUTPATIENT SERVICES | Facility: HOSPITAL | Age: 48
LOS: 1 days | End: 2023-05-26
Payer: COMMERCIAL

## 2023-05-26 DIAGNOSIS — K02.52 DENTAL CARIES ON PIT AND FISSURE SURFACE PENETRATING INTO DENTIN: ICD-10-CM

## 2023-05-26 PROCEDURE — D2150: CPT

## 2023-05-31 DIAGNOSIS — K02.9 DENTAL CARIES, UNSPECIFIED: ICD-10-CM

## 2023-06-29 ENCOUNTER — APPOINTMENT (OUTPATIENT)
Dept: INFECTIOUS DISEASE | Facility: CLINIC | Age: 48
End: 2023-06-29
Payer: COMMERCIAL

## 2023-06-29 ENCOUNTER — OUTPATIENT (OUTPATIENT)
Dept: OUTPATIENT SERVICES | Facility: HOSPITAL | Age: 48
LOS: 1 days | End: 2023-06-29
Payer: COMMERCIAL

## 2023-06-29 VITALS
DIASTOLIC BLOOD PRESSURE: 60 MMHG | HEART RATE: 85 BPM | HEIGHT: 67 IN | SYSTOLIC BLOOD PRESSURE: 108 MMHG | OXYGEN SATURATION: 98 % | WEIGHT: 154 LBS | TEMPERATURE: 98.5 F | RESPIRATION RATE: 16 BRPM | BODY MASS INDEX: 24.17 KG/M2

## 2023-06-29 DIAGNOSIS — B20 HUMAN IMMUNODEFICIENCY VIRUS [HIV] DISEASE: ICD-10-CM

## 2023-06-29 PROCEDURE — 99214 OFFICE O/P EST MOD 30 MIN: CPT | Mod: GC

## 2023-06-29 PROCEDURE — 99214 OFFICE O/P EST MOD 30 MIN: CPT

## 2023-06-29 PROCEDURE — 90471 IMMUNIZATION ADMIN: CPT

## 2023-06-29 PROCEDURE — 90746 HEPB VACCINE 3 DOSE ADULT IM: CPT

## 2023-06-30 NOTE — HISTORY OF PRESENT ILLNESS
[FreeTextEntry1] : 49 yo F with PMH of HIV, Sciatica, HLD, Mood disorder, who presents for follow-up. \par \par She has no acute complaints and feels well.

## 2023-06-30 NOTE — ASSESSMENT
[FreeTextEntry1] : HIV - \par - well controlled \par - continue Juluca \par - routine labs\par \par ASCUS - Anal pap 10/2020\par - Anoscopy performed 10/5/2021 without suspicion lesions \par - ASCUS 6/3/2022 -- seen by surgery on 8/10/2022 --has follow-up this August \par \par HTN - controlled on enalparil 5 mg daily\par \par HLD - atorvastatin 40 mg daily\par \par Previous Smoker - quit \par - Age [age] -- 1/2 ppd \par - discussed CT Lung Cancer screening which she will consider after seeing Gyn, GI Colorectal follow-up \par \par HME\par Pneumovax 2011, PCV13 3/23/2011, PPSV 4/14/2022\par Hep A 5/5/2011, 2/16/2012\par Hep B, 5/5/2011, 6/2/2011, 6/30/2014 -- Hep B Sab non reactive \par Hep C Ab negative\par Menacta 6/20/2019, 9/12/2019\par HPV 10/11/2018, 1/31/2019, 6/20/2019\par Flu 11/2022 \par Completed Pfizer in 8/2021 with booster in 11/2022\par Gyn - last seen 6/3/2023\par

## 2023-07-12 LAB
ALBUMIN SERPL ELPH-MCNC: 4.1 G/DL
ALP BLD-CCNC: 78 U/L
ALT SERPL-CCNC: 10 U/L
ANION GAP SERPL CALC-SCNC: 9 MMOL/L
AST SERPL-CCNC: 15 U/L
BILIRUB SERPL-MCNC: <0.2 MG/DL
BUN SERPL-MCNC: 7 MG/DL
CALCIUM SERPL-MCNC: 9.1 MG/DL
CD16+CD56+ CELLS # BLD: 327 /UL
CD16+CD56+ CELLS NFR BLD: 10 %
CD19 CELLS NFR BLD: 1091 /UL
CD3 CELLS # BLD: 1677 /UL
CD3 CELLS NFR BLD: 54 %
CD3+CD4+ CELLS # BLD: 873 /UL
CD3+CD4+ CELLS NFR BLD: 28 %
CD3+CD4+ CELLS/CD3+CD8+ CLL SPEC: 1.06 RATIO
CD3+CD8+ CELLS # SPEC: 826 /UL
CD3+CD8+ CELLS NFR BLD: 26 %
CELLS.CD3-CD19+/CELLS IN BLOOD: 35 %
CHLORIDE SERPL-SCNC: 106 MMOL/L
CO2 SERPL-SCNC: 22 MMOL/L
CREAT SERPL-MCNC: 0.9 MG/DL
EGFR: 78 ML/MIN/1.73M2
GLUCOSE SERPL-MCNC: 99 MG/DL
HIV1 RNA # SERPL NAA+PROBE: <20 COPIES/ML
POTASSIUM SERPL-SCNC: 4.4 MMOL/L
PROT SERPL-MCNC: 6.5 G/DL
SODIUM SERPL-SCNC: 137 MMOL/L
VIRAL LOAD INTERP: DETECTED COPIES/ML
VIRAL LOAD LOG: <1.3 LOGCOPIES/ML

## 2023-08-09 ENCOUNTER — NON-APPOINTMENT (OUTPATIENT)
Age: 48
End: 2023-08-09

## 2023-08-21 ENCOUNTER — LABORATORY RESULT (OUTPATIENT)
Age: 48
End: 2023-08-21

## 2023-08-22 ENCOUNTER — APPOINTMENT (OUTPATIENT)
Dept: SURGERY | Facility: CLINIC | Age: 48
End: 2023-08-22
Payer: COMMERCIAL

## 2023-08-22 VITALS
SYSTOLIC BLOOD PRESSURE: 108 MMHG | DIASTOLIC BLOOD PRESSURE: 74 MMHG | HEIGHT: 67 IN | BODY MASS INDEX: 24.33 KG/M2 | TEMPERATURE: 97.1 F | WEIGHT: 155 LBS | HEART RATE: 105 BPM | OXYGEN SATURATION: 98 %

## 2023-08-22 DIAGNOSIS — R85.619 UNSPECIFIED ABNORMAL CYTOLOGICAL FINDINGS IN SPECIMENS FROM ANUS: ICD-10-CM

## 2023-08-22 PROCEDURE — 46600 DIAGNOSTIC ANOSCOPY SPX: CPT

## 2023-09-07 PROBLEM — R85.619 ABNORMAL PAP SMEAR OF ANUS: Status: ACTIVE | Noted: 2017-08-02

## 2023-09-07 NOTE — PROCEDURE
[FreeTextEntry1] : Anal pap smear was performed, digital rectal exam and anoscopy shows normal sphincter tone, normal internal hemorrhoids, and no masses.

## 2023-09-07 NOTE — PHYSICAL EXAM
[FreeTextEntry1] : General: Awake, Alert, No Acute Distress  Respiratory: Normal Respiratory Effort  Rectal: External examination shows no significant abnormalities, no fissures, fistulas, abscesses, excoriations or condyloma.

## 2023-09-07 NOTE — ASSESSMENT
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   We performed a repeat anal pap today. We will contact the patient with the results. If they are normal she will return in one year.

## 2023-09-07 NOTE — ADDENDUM
[FreeTextEntry1] : I, Vanesa Moore ([facility]) assisted in filling out this chart under the dictation of Dr. Dar Maria on 08/25/2023.

## 2023-09-07 NOTE — HISTORY OF PRESENT ILLNESS
[FreeTextEntry1] : Patient is a 48F with PMH COPD, HIV, anal dysplasia who presents for follow up. Patient had an anal pap in 6/2022 that showed ASCUS. Previous pap in 2020 also showed ASCUS. Patient denies any significant symptoms. Patient presents today for surveillance and anal and repeat anal pap. Patient denies fevers, chills, nausea, vomiting, abdominal pain, constipation, diarrhea, blood in the stool or unexpected weight loss. Patient denies a family history of colon cancer rectal cancer or inflammatory bowel disease.

## 2023-09-14 ENCOUNTER — OUTPATIENT (OUTPATIENT)
Dept: OUTPATIENT SERVICES | Facility: HOSPITAL | Age: 48
LOS: 1 days | End: 2023-09-14
Payer: COMMERCIAL

## 2023-09-14 ENCOUNTER — APPOINTMENT (OUTPATIENT)
Dept: INFECTIOUS DISEASE | Facility: CLINIC | Age: 48
End: 2023-09-14
Payer: COMMERCIAL

## 2023-09-14 VITALS
HEIGHT: 67 IN | BODY MASS INDEX: 23.07 KG/M2 | OXYGEN SATURATION: 99 % | SYSTOLIC BLOOD PRESSURE: 100 MMHG | WEIGHT: 147 LBS | HEART RATE: 97 BPM | DIASTOLIC BLOOD PRESSURE: 69 MMHG | RESPIRATION RATE: 16 BRPM | TEMPERATURE: 98 F

## 2023-09-14 DIAGNOSIS — B20 HUMAN IMMUNODEFICIENCY VIRUS [HIV] DISEASE: ICD-10-CM

## 2023-09-14 DIAGNOSIS — Z23 ENCOUNTER FOR IMMUNIZATION: ICD-10-CM

## 2023-09-14 DIAGNOSIS — Z00.00 ENCOUNTER FOR GENERAL ADULT MEDICAL EXAMINATION W/OUT ABNORMAL FINDINGS: ICD-10-CM

## 2023-09-14 DIAGNOSIS — Z00.00 ENCOUNTER FOR GENERAL ADULT MEDICAL EXAMINATION WITHOUT ABNORMAL FINDINGS: ICD-10-CM

## 2023-09-14 PROCEDURE — 90834 PSYTX W PT 45 MINUTES: CPT

## 2023-09-14 PROCEDURE — 99214 OFFICE O/P EST MOD 30 MIN: CPT

## 2023-09-14 PROCEDURE — 90471 IMMUNIZATION ADMIN: CPT

## 2023-09-14 PROCEDURE — G9012: CPT

## 2023-09-14 PROCEDURE — CPLAN: CPT

## 2023-09-14 PROCEDURE — 90686 IIV4 VACC NO PRSV 0.5 ML IM: CPT

## 2023-09-18 LAB
ALBUMIN SERPL ELPH-MCNC: 4.5 G/DL
ALP BLD-CCNC: 86 U/L
ALT SERPL-CCNC: 13 U/L
ANION GAP SERPL CALC-SCNC: 11 MMOL/L
AST SERPL-CCNC: 20 U/L
BASOPHILS # BLD AUTO: 0.03 K/UL
BASOPHILS NFR BLD AUTO: 0.5 %
BILIRUB SERPL-MCNC: 0.2 MG/DL
BUN SERPL-MCNC: 9 MG/DL
CALCIUM SERPL-MCNC: 9.1 MG/DL
CD16+CD56+ CELLS # BLD: 425 /UL
CD16+CD56+ CELLS NFR BLD: 14 %
CD19 CELLS NFR BLD: 933 /UL
CD3 CELLS # BLD: 1689 /UL
CD3 CELLS NFR BLD: 55 %
CD3+CD4+ CELLS # BLD: 809 /UL
CD3+CD4+ CELLS NFR BLD: 26 %
CD3+CD4+ CELLS/CD3+CD8+ CLL SPEC: 0.91 RATIO
CD3+CD8+ CELLS # SPEC: 891 /UL
CD3+CD8+ CELLS NFR BLD: 29 %
CELLS.CD3-CD19+/CELLS IN BLOOD: 30 %
CHLORIDE SERPL-SCNC: 103 MMOL/L
CO2 SERPL-SCNC: 21 MMOL/L
CREAT SERPL-MCNC: 1 MG/DL
EGFR: 69 ML/MIN/1.73M2
EOSINOPHIL # BLD AUTO: 0.05 K/UL
EOSINOPHIL NFR BLD AUTO: 0.8 %
GLUCOSE SERPL-MCNC: 85 MG/DL
HCT VFR BLD CALC: 38 %
HGB BLD-MCNC: 12.2 G/DL
HIV1 RNA # SERPL NAA+PROBE: <20 COPIES/ML
IMM GRANULOCYTES NFR BLD AUTO: 0.2 %
LYMPHOCYTES # BLD AUTO: 3.06 K/UL
LYMPHOCYTES NFR BLD AUTO: 50.4 %
MAN DIFF?: NORMAL
MCHC RBC-ENTMCNC: 30.7 PG
MCHC RBC-ENTMCNC: 32.1 G/DL
MCV RBC AUTO: 95.5 FL
MONOCYTES # BLD AUTO: 0.42 K/UL
MONOCYTES NFR BLD AUTO: 6.9 %
NEUTROPHILS # BLD AUTO: 2.5 K/UL
NEUTROPHILS NFR BLD AUTO: 41.2 %
PLATELET # BLD AUTO: 300 K/UL
POTASSIUM SERPL-SCNC: 4.7 MMOL/L
PROT SERPL-MCNC: 6.8 G/DL
RBC # BLD: 3.98 M/UL
RBC # FLD: 13.6 %
SODIUM SERPL-SCNC: 135 MMOL/L
VIRAL LOAD INTERP: DETECTED
VIRAL LOAD LOG: <1.3 LOGCOPIES/ML
WBC # FLD AUTO: 6.07 K/UL

## 2023-10-03 ENCOUNTER — OUTPATIENT (OUTPATIENT)
Dept: OUTPATIENT SERVICES | Facility: HOSPITAL | Age: 48
LOS: 1 days | End: 2023-10-03
Payer: COMMERCIAL

## 2023-10-03 ENCOUNTER — RESULT REVIEW (OUTPATIENT)
Age: 48
End: 2023-10-03

## 2023-10-03 DIAGNOSIS — Z12.31 ENCOUNTER FOR SCREENING MAMMOGRAM FOR MALIGNANT NEOPLASM OF BREAST: ICD-10-CM

## 2023-10-03 PROCEDURE — 77067 SCR MAMMO BI INCL CAD: CPT

## 2023-10-03 PROCEDURE — 77063 BREAST TOMOSYNTHESIS BI: CPT

## 2023-10-03 PROCEDURE — 77063 BREAST TOMOSYNTHESIS BI: CPT | Mod: 26

## 2023-10-03 PROCEDURE — 77067 SCR MAMMO BI INCL CAD: CPT | Mod: 26

## 2023-10-04 ENCOUNTER — NON-APPOINTMENT (OUTPATIENT)
Age: 48
End: 2023-10-04

## 2023-10-04 DIAGNOSIS — Z12.31 ENCOUNTER FOR SCREENING MAMMOGRAM FOR MALIGNANT NEOPLASM OF BREAST: ICD-10-CM

## 2023-10-10 ENCOUNTER — RESULT REVIEW (OUTPATIENT)
Age: 48
End: 2023-10-10

## 2023-10-10 ENCOUNTER — OUTPATIENT (OUTPATIENT)
Dept: OUTPATIENT SERVICES | Facility: HOSPITAL | Age: 48
LOS: 1 days | End: 2023-10-10
Payer: COMMERCIAL

## 2023-10-10 DIAGNOSIS — Z00.8 ENCOUNTER FOR OTHER GENERAL EXAMINATION: ICD-10-CM

## 2023-10-10 DIAGNOSIS — R92.8 OTHER ABNORMAL AND INCONCLUSIVE FINDINGS ON DIAGNOSTIC IMAGING OF BREAST: ICD-10-CM

## 2023-10-10 PROCEDURE — 77066 DX MAMMO INCL CAD BI: CPT

## 2023-10-10 PROCEDURE — 77066 DX MAMMO INCL CAD BI: CPT | Mod: 26

## 2023-10-10 PROCEDURE — 76642 ULTRASOUND BREAST LIMITED: CPT | Mod: 26,50

## 2023-10-10 PROCEDURE — 76642 ULTRASOUND BREAST LIMITED: CPT | Mod: 50

## 2023-10-10 PROCEDURE — G0279: CPT | Mod: 26

## 2023-10-10 PROCEDURE — G0279: CPT

## 2023-10-11 DIAGNOSIS — R92.8 OTHER ABNORMAL AND INCONCLUSIVE FINDINGS ON DIAGNOSTIC IMAGING OF BREAST: ICD-10-CM

## 2023-10-26 DIAGNOSIS — Z71.6 TOBACCO ABUSE COUNSELING: ICD-10-CM

## 2023-12-13 ENCOUNTER — OUTPATIENT (OUTPATIENT)
Dept: OUTPATIENT SERVICES | Facility: HOSPITAL | Age: 48
LOS: 1 days | End: 2023-12-13
Payer: COMMERCIAL

## 2023-12-13 PROCEDURE — G9012: CPT

## 2023-12-14 ENCOUNTER — OUTPATIENT (OUTPATIENT)
Dept: OUTPATIENT SERVICES | Facility: HOSPITAL | Age: 48
LOS: 1 days | End: 2023-12-14
Payer: COMMERCIAL

## 2023-12-14 ENCOUNTER — APPOINTMENT (OUTPATIENT)
Dept: INFECTIOUS DISEASE | Facility: CLINIC | Age: 48
End: 2023-12-14
Payer: COMMERCIAL

## 2023-12-14 DIAGNOSIS — B20 HUMAN IMMUNODEFICIENCY VIRUS [HIV] DISEASE: ICD-10-CM

## 2023-12-14 PROCEDURE — 99213 OFFICE O/P EST LOW 20 MIN: CPT

## 2023-12-14 PROCEDURE — ZZZZZ: CPT

## 2023-12-14 NOTE — REASON FOR VISIT
[Home] : at home, [unfilled] , at the time of the visit. [Medical Office: ([facility])___] : at the medical office located in  [Verbal consent obtained from patient] : the patient, [unfilled] [Follow-Up: _____] : a [unfilled] follow-up visit [FreeTextEntry1] : HIV follow-up

## 2023-12-14 NOTE — HISTORY OF PRESENT ILLNESS
[FreeTextEntry1] : 47 yo F with PMH of HIV, Sciatica, HLD, Mood disorder, who presents for follow-up.   She has no acute complaints and feels well.

## 2023-12-14 NOTE — ASSESSMENT
[FreeTextEntry1] : HIV - - well controlled - continue Juluca - repeat labs today   ASCUS - Anal pap 10/2020 - Anoscopy performed 10/5/2021 without suspicion lesions - ASCUS 6/3/2022 -- seen by surgery on 8/10/2022  - Anal pap 8/22/2023 - negative for malignancy  HTN - on enalparil 5 mg daily  HLD - atorvastatin 40 mg daily, controlled on 2/9/2022  Previous Smoker - quit - Age [age] -- 1/2 ppd - ordered Low dose CT chest for lung cancer screening - not completed yet  HME Pneumovax 2011, PCV13 3/23/2011, PPSV 4/14/2022 Hep A 5/5/2011, 2/16/2012 Hep B, 5/5/2011, 6/2/2011, 6/30/2014 -- Hep B Sab non reactive Hep C Ab negative Menacta 6/20/2019, 9/12/2019 HPV 10/11/2018, 1/31/2019, 6/20/2019 Flu 11/2022  Colonoscopy: referral given Gyn - last seen 6/3/2023 - has to make follow-up  Mammogram 10/3/2023 WNL - check yearly

## 2023-12-14 NOTE — REVIEW OF SYSTEMS
[Fever] : no fever [Chills] : no chills [Eye Pain] : no eye pain [Red Eyes] : eyes not red [Earache] : no earache [Loss Of Hearing] : no hearing loss [Chest Pain] : no chest pain [Palpitations] : no palpitations [Shortness Of Breath] : no shortness of breath [Wheezing] : no wheezing [Abdominal Pain] : no abdominal pain [Vomiting] : no vomiting [Dysuria] : no dysuria [Joint Pain] : no joint pain [Skin Lesions] : no skin lesions [Negative] : Psychiatric

## 2023-12-15 DIAGNOSIS — B20 HUMAN IMMUNODEFICIENCY VIRUS [HIV] DISEASE: ICD-10-CM

## 2024-02-13 NOTE — ED ADULT NURSE NOTE - CCCP TRG CHIEF CMPLNT
Bigg Montez,    Below is a recap of our conversation regarding your upcoming Sleeve Gastrectomy on 3/4/24 at 7:30 AM with Dr. Teddy Eden.  You should receive a call from the hospital surgery department 1-2 days before surgery confirming you surgery time and arrival time.  You should arrive at the hospital 2 hours prior to your surgery time.    SURGERY LOCATION: [address] [place] [address], [facility]    SURGERY CANCELLATION  If something occurs in which you need to cancel your surgery, please contact Josemanuel at [phone], as soon as possible.      BEFORE SURGERY    MEDICATIONS TO STOP BEFORE SURGERY  ASPIRIN  - stop 7 days before surgery.  BLOOD THINNERS - Need a bridging plan with your prescribing provider.  NSAIDS - stop 7 days before surgery and do not restart after surgery.  CHRONIC NARCOTIC PAIN MEDICATION  - need a plan with pain provider and surgeon.  HERBAL SUPPLEMENTS - Stop 7 days before surgery.  VITAMINS - Stop 7 days before surgery.    ANTIOBESITY MEDICATIONS TO STOP BEFORE SURGERY  TOPIRAMATE - take the day of surgery in the morning.  NALTREXONE - Stop 4 days before surgery. Do not restart.  CONTRAVE - Taper with 1 tablet twice a day for 1 week, then stop 4 days before surgery.  Do not restart.  QSYMIA (7.5 mg/46 mg) - Stop 7 days before surgery.  If on a larger dose, will need a plan for tapering.  Do not restart.  PHENTERMINE - Stop 7 days before surgery.  Do not restart.  GLP-1 (Victoza, Saxenda)  - Last dose the day before surgery.  Do not restart.  GLP-1 (Ozempic, Wegovy, Trulicity, Mounjaro) - Last dose no later than 1 week before surgery.  Do not restart.  METFORMIN - Last dose the day before surgery.  Do not restart.    MEDICATION REVIEW  FIRST 4 WEEKS POSTOP - Medications should be liquid, chewable, crushed or pills smaller than 1/4 inch.    AFTER 4 WEEKS - Take small pills or cut up larger pills to be smaller than 1/2 inch.  Do not crush or open medications that are long acting or  abdominal pain extended relief. Check with your prescribing provider to see if there is a different form or option.    DAY BEFORE YOUR SURGERY  Starting in the morning, follow a clear liquid diet.  Stay away from red liquids and liquids with pulp.  Ensure you are well hydrated all day!  Strive for at least 64 ounces.  Nothing after midnight except water!  You may have sips of water up to 3 hours before your surgery.   Take your medications as directed from the PAC clinic.  You may have approved medications up to 3 hours before your surgery time.    SHOWER  You will take a shower the night before surgery and a shower the morning of surgery.  Follow instructions for pre-op shower using the required soap (4% CHG,    Hibiclens, Exidine, chlorhexidine).  Let the soap sit on your skin for a minute and then rinse.  After your evening shower, dry off with a clean towel, put on clean pajamas and get into a bed with clean sheets.  After your morning shower, dry off with a clean towel and put on clean comfortable clothes.  The soap can be very drying.  Do not put any deodorant, lotion or powder on after your shower.    TRANSPORTATION & POSTOP CARE  You will need a  to take you to the hospital the day of surgery.  You will need a  to pick you up from the hospital the day of discharge (usually the afternoon/evening the day after surgery).  You will need someone to stay with you for the first couple of days after surgery.  If you live greater than an hour from the hospital, you will need to stop every 50-60 minutes to get out of the car and walk around.         PRESCRIPTIONS FROM YOUR PHARMACY:   Please plan to pick these up before surgery and have them at home for when you are discharged. Do not start taking them until after surgery. Do not bring them to the hospital with you!    Prilosec (omeprazole) OR Alternative:   This medication is for control of stomach acid.  You will take this medication daily for the first three  months after surgery.  TO TAKE: Open the capsule and put the beads in applesauce.  Take every morning.  If you are currently on a medication for GERD or Reflux, you will just continue that medication.    Levsin (hyoscyamine) or Alternative:   This medication is to help control spasms/cramping in the stomach and intestines.    Take one tablet every 4 hours as needed for cramping.    It may be helpful to take in the morning before taking any other medications.    Zofran (ondansetron) or Alternative:    This medication is for nausea.    To Take: Place one tablet on the tongue and let it dissolve.  You can take this every 6 hours, as needed.    Senna:   This medication is a stool softener:  Take as directed to help avoid constipation.  It is important to take this medication if you are taking a narcotic pain medicine as the pain medication can be constipating.    If you experience diarrhea, please stop taking the Senna.      IN THE HOSPITAL  Use your Incentive Spirometer  Get up. You should be up walking at least 3 times (or more) each day while in the hospital.  Trips to the bathroom are great but they are not a long enough distance.  Ask for an abdominal binder for extra abdominal support.  Wear your abdominal as needed, for comfort.  Sip through your fluids!  Frequent sips - about 1 tsp every couple of minutes.  Drinking more than that at a time can contribute to pain and cramping.   Ask for some medicine cups to take home to help you measure your fluids.  Remember to take your abdominal binder and incentive spirometer home with you when you are discharged!!      AFTER SURGERY    REGULARLY SCHEDULED MEDICATIONS    Depending on the size and number of medications you take, you may need to space/change the time you take your medications, so you do not overfill your stomach.  Make sure you follow-up with your primary provider to make medication changes needed.  DIABETES: If you have diabetes, monitor your blood sugars  more frequently after surgery.  Your blood sugars may normalize quickly.  Please contact your prescribing provider if you need your medication adjusted.  HIGH BLOOD PRESSURE: If you have high blood pressure, monitor your blood pressure after surgery.  Your blood pressure may normalize quickly.  Please contact your prescribing provider if you need your medication adjusted.      POSTOPERATIVE MEDICATIONS  Take your postop medications as prescribed.  You can start a chewable Multivitamin when you get home.  Do not start any additional vitamins until you meet with your provider and dietician to receive further instructions.        PAIN CONTROL  Your pain medication prescription at discharge is a different dosage and timing than what you were taking in the hospital!  Follow the new directions.Take your pain medicine as needed, as directed.  Start with the minimal amount prescribed and supplement with Tylenol or Extra Strength Tylenol.  Take the minimal amount as directed for severe pain. The pain medicine can cause constipation.  Do not drive while taking narcotic pain medication.    For mild to moderate pain, you can take Tylenol or Extra Strength Tylenol per the bottle instructions.  DO NOT TAKE NSAIDS: IBUPROFEN, ASPIRIN OR NAPROXEN.  Change positions frequently.  Walking around for 10-15 minutes once an hour will also help.ICE: Use an ice pack on the incisions for the first 24 - 48 hours:  Use a barrier between the ice pack and the skin.  Do not leave the ice on longer than 20 minutes at each time.    HEAT: You may also try a heating pad on your abdomen to help soothe cramping.  Use a barrier between the heating pad and your skin.  Do not leave on longer than 20 minutes at each time.  Wear your abdominal binder as needed.  You will need someone to stay with you for the first 1-2 days after surgery, especially if you are taking prescription pain medicine.  Please share the information regarding fluid intake and  activity with your caregiver so they can help support you in your efforts.  No driving until you have been off narcotic pain medications for at least 24 hours.    DIET    For Dr. Eden and Dr. Zhang Patients:  You will be following the Stage 1 - Clear Liquid diet upon discharge.  Ensure you have a variety of proper clear liquids at home to support you in taking in the proper nutrition.  Please refer to the Stage 1 - Clear Liquid diet handout provided by the Dietician.  Stage 2 (Full Liquid) Start Date: 3/11/24  Stage 3 (Pureed) Diet Start Date: 3/18/24  Stage 4 (Soft Foods) Diet Start Date: 4/1/24  Stage 5 (Regular) Diet Start Date: 5/1/24     HYDRATION  Your goal is 48 to 64 ounces each day (4-6 ounces each hour).  This amount will help prevent dehydration.    It is important to measure and keep a tally sheet of your intake for the first month after surgery.  Keep your tally sheet next to you and alex each time you drink one ounce of fluid.  You should track your fluids for the first month after surgery and if you are ill.  All fluids count in your fluid intake!  Keep a water bottle or thermal bottle by your bed.  Drink a little before going to sleep, if you wake in the middle of the night, and in the morning before getting out of bed.  Keep an eye on your urine.  It is a good indicator of your hydration status.  Your urine should be clear yellow or clear light yellow.    You will learn about advancing to a pureed diet at your first postop Dietician appointment.     BOWELS/CONSTIPATION   Movement is important to keep your bowels working.  Get up and walk at least each hour while you are awake.  It is not unusual to not have a bowel movement for a few days after surgery.  You should be passing gas each day.   Keep taking the SENNA until you have had a regular bowel movement and are off the narcotic pain medication.   If you haven't had a bowel movement by the 4th day after surgery, take one dose of Miralax  (according to package directions).  Repeat dose if no results.  If you still don't have any results, use a Fleet Enema.  If still no results, call your provider's office.   It is normal to have a little blood in your first couple of bowel movements.  If the blood continues, please contact our office.  If you are having gas pains and bloating, you can try Gas-X.    BREATHING EXERCISES  Use your incentive spirometer each hour while awake.  Sitting up or standing, take 5 - 10 slow, deep breaths each time, focusing on expanding your lungs.  This should be done for the first couple of weeks after surgery.  These exercises will help eliminate gases from your system (anesthesia and surgical).      ACTIVITY & EXERCISE  Get up and walk around each hour while you are awake - at least 10 minutes.  Walking will help with circulation, bowel regulation and will help you feel better.  Do not lift anything greater than 10-15 lb for the first 4 weeks.  The only exercise you can start right after surgery is WALKING.  Walking is good for the gut, the circulation and your breathing!   Seated Exercises for Arms and Legs (can be done before or after surgery) [email or web address]  Exercise Guidelines after Weight Loss Surgery (1st 4-6 weeks): [email or web address]  Exercises after Weight Loss Surgery (strengthening, when no weightlifting restrictions - after 4-6 weeks) :  [email or web address]       WOUND CARE  You will have steri-strips over your incision after surgery. They will fall off over the first 7-10 days after surgery.  If the steri strips fall off before your incisions are healed, replace them with a bandaid to pull the incision together.   You may have bruising around your wounds. This is normal. It will go away on its own. The skin around your incisions may be a little red. This is normal, too.   Showering: you may shower the day you get home unless your surgeon tells you differently.   Wash gently around incisions with warm soapy water, rinse well, and gently pat dry.    DO NOT wear tight clothing that rubs against your incisions while they heal.   DO NOT soak in a bathtub, swimming pool, or hot tub until your incisions are healed completely, usually around 7-14 days. No tub baths or swimming until all incisions are healed.    CARE COMPANION  Track your fluid intake!  Report total fluids, not just water intake.  Reminders to set up follow up appointments.  Notifications of frequently asked questions.  Please use [employer] for any concerns regarding your health.    FOLLOW-UP CALL  You will receive a post-op phone call two to three days after surgery to check in and see how you are doing (If your surgery is on a Friday, your phone call will be on the Monday following your surgery).    You can always send us a message via [employer] if you have any questions or concerns.      CALL YOUR PROVIDER IF:      You have more redness, pain, warmth, swelling, or bleeding around your incision.     The wound is larger or deeper or looks dark or dried out.     The drainage from your incision does not decrease in 3 to 5 days or increases.     The drainage becomes thick, tan or yellow and has a bad smell (pus).     Your temperature is above 100 F (37.7 C) for more than 4 hours.     You have pain that your pain medicine is not helping.     You have chest and/or belly pain.     You have trouble breathing.     You have a cough that does not go away.     You cannot drink or eat.     You have a fast heartbeat.     You are dizzy or lightheaded.     You are not passing gas and have not had a bowel movement after following instructions above.     Your stools are loose, or you have diarrhea.     You are vomiting after eating.         Please let us know if you have any additional questions.    Sincerely,    Brandi Edmonds RN, BSN  RN Care Coordinator  Bariatric Surgery and Comprehensive Weight Management  Phone: [phone]

## 2024-03-13 ENCOUNTER — LABORATORY RESULT (OUTPATIENT)
Age: 49
End: 2024-03-13

## 2024-03-23 LAB
ALBUMIN SERPL ELPH-MCNC: 4.4 G/DL
ALP BLD-CCNC: 85 U/L
ALT SERPL-CCNC: 11 U/L
ANION GAP SERPL CALC-SCNC: 14 MMOL/L
AST SERPL-CCNC: 15 U/L
BASOPHILS # BLD AUTO: 0 K/UL
BASOPHILS NFR BLD AUTO: 0 %
BILIRUB SERPL-MCNC: <0.2 MG/DL
BUN SERPL-MCNC: 12 MG/DL
CALCIUM SERPL-MCNC: 9 MG/DL
CD16+CD56+ CELLS # BLD: 366 CELLS/UL
CD16+CD56+ CELLS NFR BLD: 10 %
CD19 CELLS NFR BLD: 1207 CELLS/UL
CD3 CELLS # BLD: 1892 CELLS/UL
CD3 CELLS NFR BLD: 50 %
CD3+CD4+ CELLS # BLD: 910 CELLS/UL
CD3+CD4+ CELLS NFR BLD: 24 %
CD3+CD4+ CELLS/CD3+CD8+ CLL SPEC: 0.94 RATIO
CD3+CD8+ CELLS # SPEC: 967 CELLS/UL
CD3+CD8+ CELLS NFR BLD: 26 %
CELLS.CD3-CD19+/CELLS IN BLOOD: 34 %
CHLORIDE SERPL-SCNC: 104 MMOL/L
CO2 SERPL-SCNC: 20 MMOL/L
CREAT SERPL-MCNC: 0.9 MG/DL
EGFR: 77 ML/MIN/1.73M2
EOSINOPHIL # BLD AUTO: 0 K/UL
EOSINOPHIL NFR BLD AUTO: 0 %
GLUCOSE SERPL-MCNC: 111 MG/DL
HCT VFR BLD CALC: 39.8 %
HGB BLD-MCNC: 13.3 G/DL
HIV1 RNA # SERPL NAA+PROBE: NOT DETECTED COPIES/ML
LYMPHOCYTES # BLD AUTO: 3.19 K/UL
LYMPHOCYTES NFR BLD AUTO: 43 %
MAN DIFF?: NORMAL
MCHC RBC-ENTMCNC: 31.7 PG
MCHC RBC-ENTMCNC: 33.4 G/DL
MCV RBC AUTO: 94.8 FL
MONOCYTES # BLD AUTO: 0.52 K/UL
MONOCYTES NFR BLD AUTO: 7 %
NEUTROPHILS # BLD AUTO: 3.12 K/UL
NEUTROPHILS NFR BLD AUTO: 42 %
PLATELET # BLD AUTO: 278 K/UL
POTASSIUM SERPL-SCNC: 4.6 MMOL/L
PROT SERPL-MCNC: 6.8 G/DL
RBC # BLD: 4.2 M/UL
RBC # FLD: 13.1 %
SODIUM SERPL-SCNC: 138 MMOL/L
VIRAL LOAD INTERP: NOT DETECTED
VIRAL LOAD LOG: NOT DETECTED LOGCOPIES/ML
WBC # FLD AUTO: 7.42 K/UL

## 2024-04-18 ENCOUNTER — OUTPATIENT (OUTPATIENT)
Dept: OUTPATIENT SERVICES | Facility: HOSPITAL | Age: 49
LOS: 1 days | End: 2024-04-18
Payer: COMMERCIAL

## 2024-04-18 ENCOUNTER — NON-APPOINTMENT (OUTPATIENT)
Age: 49
End: 2024-04-18

## 2024-04-18 ENCOUNTER — APPOINTMENT (OUTPATIENT)
Dept: INFECTIOUS DISEASE | Facility: CLINIC | Age: 49
End: 2024-04-18

## 2024-04-18 PROCEDURE — G9012: CPT

## 2024-04-19 DIAGNOSIS — B20 HUMAN IMMUNODEFICIENCY VIRUS [HIV] DISEASE: ICD-10-CM

## 2024-05-02 ENCOUNTER — APPOINTMENT (OUTPATIENT)
Dept: OBGYN | Facility: CLINIC | Age: 49
End: 2024-05-02
Payer: COMMERCIAL

## 2024-05-02 ENCOUNTER — OUTPATIENT (OUTPATIENT)
Dept: OUTPATIENT SERVICES | Facility: HOSPITAL | Age: 49
LOS: 1 days | End: 2024-05-02
Payer: COMMERCIAL

## 2024-05-02 VITALS
DIASTOLIC BLOOD PRESSURE: 60 MMHG | SYSTOLIC BLOOD PRESSURE: 110 MMHG | WEIGHT: 141 LBS | BODY MASS INDEX: 22.13 KG/M2 | HEIGHT: 67 IN

## 2024-05-02 DIAGNOSIS — Z01.419 ENCOUNTER FOR GYNECOLOGICAL EXAMINATION (GENERAL) (ROUTINE) WITHOUT ABNORMAL FINDINGS: ICD-10-CM

## 2024-05-02 PROCEDURE — 99459 PELVIC EXAMINATION: CPT

## 2024-05-02 PROCEDURE — 99396 PREV VISIT EST AGE 40-64: CPT

## 2024-05-02 PROCEDURE — 88142 CYTOPATH C/V THIN LAYER: CPT

## 2024-05-02 PROCEDURE — 88112 CYTOPATH CELL ENHANCE TECH: CPT

## 2024-05-02 PROCEDURE — 87624 HPV HI-RISK TYP POOLED RSLT: CPT

## 2024-05-03 ENCOUNTER — OUTPATIENT (OUTPATIENT)
Dept: OUTPATIENT SERVICES | Facility: HOSPITAL | Age: 49
LOS: 1 days | End: 2024-05-03

## 2024-05-03 DIAGNOSIS — Z01.419 ENCOUNTER FOR GYNECOLOGICAL EXAMINATION (GENERAL) (ROUTINE) WITHOUT ABNORMAL FINDINGS: ICD-10-CM

## 2024-05-04 DIAGNOSIS — Z01.419 ENCOUNTER FOR GYNECOLOGICAL EXAMINATION (GENERAL) (ROUTINE) WITHOUT ABNORMAL FINDINGS: ICD-10-CM

## 2024-05-05 NOTE — HISTORY OF PRESENT ILLNESS
[Patient reported mammogram was normal] : Patient reported mammogram was normal [Patient reported PAP Smear was normal] : Patient reported PAP Smear was normal [FreeTextEntry1] : 50yo , LMP 24, HIV+, here for annual exam. Doing well, no complaints, requesting cervical and anal pap smears.  Regular monthly periods. Sexually with one male partner, uses condoms. H/o adnexal cyst on L, last 2.5cm in , denies abdominal pain.   [Mammogramdate] : 12/23 [PapSmeardate] : 06/2022

## 2024-05-05 NOTE — PLAN
[FreeTextEntry1] : 50yo P2, h/o HIV, h/o ASCUS (cervix), HPV pos (anal) here for annual exam   # h/o ASCUS, HPV pos - f/u cervical pap and HPV  - f/u anal pap and HPV  # h/o ovarian cyst  - f/u TVUS  # HCM  - referral for mammogram given  RTC 1 year or PRN

## 2024-05-06 DIAGNOSIS — Z01.419 ENCOUNTER FOR GYNECOLOGICAL EXAMINATION (GENERAL) (ROUTINE) WITHOUT ABNORMAL FINDINGS: ICD-10-CM

## 2024-05-10 LAB
ANAL PAP CYTOLOGY: NORMAL
CYTOLOGY CVX/VAG DOC THIN PREP: ABNORMAL
HPV DNA, HIGH RISK, LOW RISK, ANAL: NOT DETECTED
HPV HIGH+LOW RISK DNA PNL CVX: NOT DETECTED

## 2024-05-22 ENCOUNTER — APPOINTMENT (OUTPATIENT)
Dept: INFECTIOUS DISEASE | Facility: CLINIC | Age: 49
End: 2024-05-22
Payer: COMMERCIAL

## 2024-05-22 ENCOUNTER — OUTPATIENT (OUTPATIENT)
Dept: OUTPATIENT SERVICES | Facility: HOSPITAL | Age: 49
LOS: 1 days | End: 2024-05-22
Payer: COMMERCIAL

## 2024-05-22 VITALS
SYSTOLIC BLOOD PRESSURE: 144 MMHG | OXYGEN SATURATION: 97 % | HEART RATE: 95 BPM | RESPIRATION RATE: 14 BRPM | BODY MASS INDEX: 21.82 KG/M2 | HEIGHT: 67 IN | WEIGHT: 139 LBS | DIASTOLIC BLOOD PRESSURE: 79 MMHG | TEMPERATURE: 98.4 F

## 2024-05-22 DIAGNOSIS — B20 HUMAN IMMUNODEFICIENCY VIRUS [HIV] DISEASE: ICD-10-CM

## 2024-05-22 PROCEDURE — 99214 OFFICE O/P EST MOD 30 MIN: CPT

## 2024-05-22 PROCEDURE — 90834 PSYTX W PT 45 MINUTES: CPT

## 2024-05-22 NOTE — ASSESSMENT
[FreeTextEntry1] : 35 minutes was spent on medical discussion re: medication compliance. On antiretroviral treatment. Patient with HIV.  Will continue antiretroviral treatment.  lab results: VL<20 considering using CBD to stop smoking.  Pt will discuss with her Psych physician to do labs in early July TM early August

## 2024-05-22 NOTE — PHYSICAL EXAM
[General Appearance - Alert] : alert [General Appearance - In No Acute Distress] : in no acute distress [Sclera] : the sclera and conjunctiva were normal [PERRL With Normal Accommodation] : pupils were equal in size, round, reactive to light [Extraocular Movements] : extraocular movements were intact [Outer Ear] : the ears and nose were normal in appearance [Oropharynx] : the oropharynx was normal with no thrush [Neck Appearance] : the appearance of the neck was normal [Neck Cervical Mass (___cm)] : no neck mass was observed [Jugular Venous Distention Increased] : there was no jugular-venous distention [Thyroid Diffuse Enlargement] : the thyroid was not enlarged [Auscultation Breath Sounds / Voice Sounds] : lungs were clear to auscultation bilaterally [Heart Rate And Rhythm] : heart rate was normal and rhythm regular [Heart Sounds] : normal S1 and S2 [Heart Sounds Gallop] : no gallops [Murmurs] : no murmurs [Heart Sounds Pericardial Friction Rub] : no pericardial rub [Bowel Sounds] : normal bowel sounds [Abdomen Soft] : soft [Abdomen Tenderness] : non-tender [Abdomen Mass (___ Cm)] : no abdominal mass palpated [Costovertebral Angle Tenderness] : no CVA tenderness [Musculoskeletal - Swelling] : no joint swelling [Nail Clubbing] : no clubbing  or cyanosis of the fingernails [Motor Tone] : muscle strength and tone were normal [Skin Color & Pigmentation] : normal skin color and pigmentation [] : no rash [FreeTextEntry1] : 2nd toe numbness [Oriented To Time, Place, And Person] : oriented to person, place, and time [Affect] : the affect was normal

## 2024-05-31 RX ORDER — NICOTINE POLACRILEX 2 MG/1
2 GUM, CHEWING BUCCAL
Qty: 1 | Refills: 1 | Status: ACTIVE | COMMUNITY
Start: 2023-10-26 | End: 1900-01-01

## 2024-07-09 ENCOUNTER — APPOINTMENT (OUTPATIENT)
Dept: ANTEPARTUM | Facility: CLINIC | Age: 49
End: 2024-07-09

## 2024-07-17 ENCOUNTER — OUTPATIENT (OUTPATIENT)
Dept: OUTPATIENT SERVICES | Facility: HOSPITAL | Age: 49
LOS: 1 days | End: 2024-07-17
Payer: COMMERCIAL

## 2024-07-17 ENCOUNTER — APPOINTMENT (OUTPATIENT)
Dept: INFECTIOUS DISEASE | Facility: CLINIC | Age: 49
End: 2024-07-17

## 2024-07-17 DIAGNOSIS — B20 HUMAN IMMUNODEFICIENCY VIRUS [HIV] DISEASE: ICD-10-CM

## 2024-07-17 LAB
ALBUMIN SERPL ELPH-MCNC: 4.5 G/DL
ALP BLD-CCNC: 85 U/L
ALT SERPL-CCNC: 12 U/L
ANION GAP SERPL CALC-SCNC: 16 MMOL/L
AST SERPL-CCNC: 17 U/L
BILIRUB SERPL-MCNC: 0.4 MG/DL
BUN SERPL-MCNC: 10 MG/DL
CALCIUM SERPL-MCNC: 9.3 MG/DL
CHLORIDE SERPL-SCNC: 104 MMOL/L
CHOLEST SERPL-MCNC: 122 MG/DL
CO2 SERPL-SCNC: 18 MMOL/L
CREAT SERPL-MCNC: 0.9 MG/DL
EGFR: 77 ML/MIN/1.73M2
GLUCOSE SERPL-MCNC: 126 MG/DL
HCT VFR BLD CALC: 40.2 %
HDLC SERPL-MCNC: 47 MG/DL
HGB BLD-MCNC: 13.3 G/DL
LDLC SERPL CALC-MCNC: 58 MG/DL
MCHC RBC-ENTMCNC: 30.5 PG
MCHC RBC-ENTMCNC: 33.1 G/DL
MCV RBC AUTO: 92.2 FL
NONHDLC SERPL-MCNC: 75 MG/DL
PLATELET # BLD AUTO: 302 K/UL
PMV BLD AUTO: 0 /100 WBCS
PMV BLD: 9.6 FL
POTASSIUM SERPL-SCNC: 4.1 MMOL/L
PROT SERPL-MCNC: 6.9 G/DL
RBC # BLD: 4.36 M/UL
RBC # FLD: 13.1 %
SODIUM SERPL-SCNC: 138 MMOL/L
TRIGL SERPL-MCNC: 83 MG/DL
WBC # FLD AUTO: 8.51 K/UL

## 2024-07-17 PROCEDURE — 99213 OFFICE O/P EST LOW 20 MIN: CPT

## 2024-07-18 LAB
CD16+CD56+ CELLS # BLD: 614 /UL
CD16+CD56+ CELLS NFR BLD: 16 %
CD19 CELLS NFR BLD: 1262 /UL
CD3 CELLS # BLD: 1888 /UL
CD3 CELLS NFR BLD: 49 %
CD3+CD4+ CELLS # BLD: 885 /UL
CD3+CD4+ CELLS NFR BLD: 23 %
CD3+CD4+ CELLS/CD3+CD8+ CLL SPEC: 0.87 RATIO
CD3+CD8+ CELLS # SPEC: 1023 /UL
CD3+CD8+ CELLS NFR BLD: 27 %
CELLS.CD3-CD19+/CELLS IN BLOOD: 33 %
HIV1 RNA # SERPL NAA+PROBE: NOT DETECTED COPIES/ML
T PALLIDUM AB SER QL IA: NEGATIVE
VIRAL LOAD INTERP: NOT DETECTED
VIRAL LOAD LOG: NOT DETECTED LOGCOPIES/ML

## 2024-08-19 ENCOUNTER — LABORATORY RESULT (OUTPATIENT)
Age: 49
End: 2024-08-19

## 2024-08-20 ENCOUNTER — APPOINTMENT (OUTPATIENT)
Dept: SURGERY | Facility: CLINIC | Age: 49
End: 2024-08-20
Payer: COMMERCIAL

## 2024-08-20 VITALS
WEIGHT: 137 LBS | BODY MASS INDEX: 21.5 KG/M2 | SYSTOLIC BLOOD PRESSURE: 138 MMHG | DIASTOLIC BLOOD PRESSURE: 80 MMHG | HEIGHT: 67 IN | OXYGEN SATURATION: 96 % | HEART RATE: 93 BPM

## 2024-08-20 DIAGNOSIS — K62.82 DYSPLASIA OF ANUS: ICD-10-CM

## 2024-08-20 PROCEDURE — 46600 DIAGNOSTIC ANOSCOPY SPX: CPT

## 2024-08-21 ENCOUNTER — APPOINTMENT (OUTPATIENT)
Dept: INFECTIOUS DISEASE | Facility: CLINIC | Age: 49
End: 2024-08-21

## 2024-08-21 ENCOUNTER — OUTPATIENT (OUTPATIENT)
Dept: OUTPATIENT SERVICES | Facility: HOSPITAL | Age: 49
LOS: 1 days | End: 2024-08-21
Payer: COMMERCIAL

## 2024-08-21 VITALS
SYSTOLIC BLOOD PRESSURE: 130 MMHG | HEIGHT: 67 IN | TEMPERATURE: 98.5 F | WEIGHT: 137 LBS | OXYGEN SATURATION: 98 % | RESPIRATION RATE: 15 BRPM | HEART RATE: 78 BPM | DIASTOLIC BLOOD PRESSURE: 85 MMHG | BODY MASS INDEX: 21.5 KG/M2

## 2024-08-21 DIAGNOSIS — B20 HUMAN IMMUNODEFICIENCY VIRUS [HIV] DISEASE: ICD-10-CM

## 2024-08-21 PROCEDURE — 99214 OFFICE O/P EST MOD 30 MIN: CPT

## 2024-08-21 RX ORDER — AZITHROMYCIN 250 MG/1
250 TABLET, FILM COATED ORAL
Qty: 6 | Refills: 0 | Status: ACTIVE | COMMUNITY
Start: 2024-08-21 | End: 1900-01-01

## 2024-08-21 NOTE — ASSESSMENT
Verified name and birth date for privacy precautions. Chart reviewed in preparation for today's visit. Chief Complaint   Patient presents with    Diabetes     follow up          Health Maintenance Due   Topic    Hepatitis C Screening     COVID-19 Vaccine (1)    Pneumococcal 65+ years (1 - PCV)    Eye Exam Retinal or Dilated     DTaP/Tdap/Td series (1 - Tdap)    Shingrix Vaccine Age 49> (1 of 2)    Medicare Yearly Exam          Wt Readings from Last 3 Encounters:   06/15/22 212 lb (96.2 kg)   05/02/22 223 lb 9.6 oz (101.4 kg)   01/03/22 224 lb 4.8 oz (101.7 kg)     Temp Readings from Last 3 Encounters:   01/03/22 98.3 °F (36.8 °C)   02/20/19 97.8 °F (36.6 °C)     BP Readings from Last 3 Encounters:   06/15/22 127/81   05/02/22 128/82   01/03/22 121/81     Pulse Readings from Last 3 Encounters:   06/15/22 65   05/02/22 73   01/03/22 80         Learning Assessment:  :     No flowsheet data found. Depression Screening:  :     3 most recent PHQ Screens 6/15/2022   PHQ Not Done -   Little interest or pleasure in doing things Not at all   Feeling down, depressed, irritable, or hopeless Not at all   Total Score PHQ 2 0       Fall Risk Assessment:  :     Fall Risk Assessment, last 12 mths 6/15/2022   Able to walk? Yes   Fall in past 12 months? 0   Do you feel unsteady? 0   Are you worried about falling 1   Is the gait abnormal? 0       Abuse Screening:  :     Abuse Screening Questionnaire 6/15/2022 1/12/2022   Do you ever feel afraid of your partner? N N   Are you in a relationship with someone who physically or mentally threatens you? N N   Is it safe for you to go home? Y Y       Coordination of Care Questionnaire:  :     1) Have you been to an emergency room, urgent care clinic since your last visit? no   Hospitalized since your last visit? no             2) Have you seen or consulted any other health care providers outside of [address] [place] [address] since your last visit?  yes, Dr. Tanesha Paulino, Dr. Edna Alarcon, [FreeTextEntry1] : 35 minutes was spent on medical discussion re: medication compliance. On antiretroviral treatment. Patient with HIV.  Will continue antiretroviral treatment. persistant cough...bronchitic exacerbation of COPD...will order z pack & GI for screening colonoscopy TM 8 weeks Dr. Mars Bolaños.   (Include any pap smears or colon screenings in this section.)    3) Do you have an Advance Directive on file? no      Patient is currently accompanied by her self.    ------------------------------------------------

## 2024-08-21 NOTE — HISTORY OF PRESENT ILLNESS
[FreeTextEntry1] : cc: persistant cough...bronchitic exacerbation of COPD...will order z pack & GI for screening colonoscopy TM 8 weeks

## 2024-08-28 PROBLEM — K62.82 ANAL DYSPLASIA: Status: ACTIVE | Noted: 2024-08-21

## 2024-08-28 NOTE — HISTORY OF PRESENT ILLNESS
[FreeTextEntry1] : Patient is a 49F with PMHx COPD, HIV, and anal dysplasia who presents for follow-up. Patient previously had ascus on anal pap smear. Most recent anal pap smear one year ago showed no abnormal cells. She presents today for surveillance and repeat anal pap smear. Patient denies fevers, chills, nausea, vomiting, abdominal pain, constipation, diarrhea, blood in the stool or unexpected weight loss. Patient denies a family history of colon cancer rectal cancer or inflammatory bowel disease.

## 2024-08-28 NOTE — ASSESSMENT
[FreeTextEntry1] : 49-year-old female with HIV and anal dysplasia.   We will contact the patient with her results, provided that there are no issues. We will see her back in one year.

## 2024-08-28 NOTE — PHYSICAL EXAM
[FreeTextEntry1] : General: Awake, Alert, No Acute Distress Respiratory: Normal Respiratory Effort.  Rectal: External examination shows no significant abnormalities.

## 2024-08-28 NOTE — ADDENDUM
[FreeTextEntry1] :  I, Vanesa Moore ([facility]) assisted in filling out this chart under the dictation of Dar Maria on 08/21/2024

## 2024-08-28 NOTE — PROCEDURE
[FreeTextEntry1] : Anal pap smear was performed. Digital rectal exam and anoscopy showed normal sphincter tone, normal internal hemorrhoids, and no masses.

## 2024-08-28 NOTE — ADDENDUM
[FreeTextEntry1] :  I, Vanesa Moore ([facility]) assisted in filling out this chart under the dictation of Dar Maria on 08/21/2024

## 2024-08-28 NOTE — END OF VISIT
[FreeTextEntry3] : Documented by Vanesa Moore acting as a scribe for Dar Maria on 08/21/2024   All medical record entries made by the Scribe were at my, Dr. Dar Maria direction and personally dictated by me on 08/21/2024 . I have reviewed the chart and agree that the record accurately reflects my personal performance of the history, physical exam, assessment and plan. I have also personally directed, reviewed, and agreed with the chart.

## 2024-10-21 ENCOUNTER — OUTPATIENT (OUTPATIENT)
Dept: OUTPATIENT SERVICES | Facility: HOSPITAL | Age: 49
LOS: 1 days | End: 2024-10-21
Payer: COMMERCIAL

## 2024-10-21 ENCOUNTER — NON-APPOINTMENT (OUTPATIENT)
Age: 49
End: 2024-10-21

## 2024-10-21 ENCOUNTER — APPOINTMENT (OUTPATIENT)
Dept: INFECTIOUS DISEASE | Facility: CLINIC | Age: 49
End: 2024-10-21

## 2024-10-21 ENCOUNTER — MED ADMIN CHARGE (OUTPATIENT)
Age: 49
End: 2024-10-21

## 2024-10-21 VITALS
RESPIRATION RATE: 14 BRPM | HEART RATE: 74 BPM | OXYGEN SATURATION: 97 % | DIASTOLIC BLOOD PRESSURE: 74 MMHG | HEIGHT: 67 IN | TEMPERATURE: 97.4 F | SYSTOLIC BLOOD PRESSURE: 109 MMHG | WEIGHT: 140 LBS | BODY MASS INDEX: 21.97 KG/M2

## 2024-10-21 DIAGNOSIS — Z23 ENCOUNTER FOR IMMUNIZATION: ICD-10-CM

## 2024-10-21 DIAGNOSIS — B20 HUMAN IMMUNODEFICIENCY VIRUS [HIV] DISEASE: ICD-10-CM

## 2024-10-21 LAB
ALBUMIN SERPL ELPH-MCNC: 4.2 G/DL
ALP BLD-CCNC: 80 U/L
ALT SERPL-CCNC: 10 U/L
ANION GAP SERPL CALC-SCNC: 12 MMOL/L
AST SERPL-CCNC: 15 U/L
BILIRUB SERPL-MCNC: <0.2 MG/DL
BUN SERPL-MCNC: 8 MG/DL
CALCIUM SERPL-MCNC: 8.8 MG/DL
CHLORIDE SERPL-SCNC: 106 MMOL/L
CHOLEST SERPL-MCNC: 132 MG/DL
CO2 SERPL-SCNC: 21 MMOL/L
CREAT SERPL-MCNC: 0.9 MG/DL
EGFR: 77 ML/MIN/1.73M2
GLUCOSE SERPL-MCNC: 109 MG/DL
HCT VFR BLD CALC: 39.1 %
HDLC SERPL-MCNC: 49 MG/DL
HGB BLD-MCNC: 13.2 G/DL
HIV1 RNA # SERPL NAA+PROBE: NOT DETECTED COPIES/ML
LDLC SERPL CALC-MCNC: 70 MG/DL
MCHC RBC-ENTMCNC: 31.4 PG
MCHC RBC-ENTMCNC: 33.8 G/DL
MCV RBC AUTO: 92.9 FL
NONHDLC SERPL-MCNC: 83 MG/DL
PLATELET # BLD AUTO: 280 K/UL
PMV BLD AUTO: 0 /100 WBCS
PMV BLD: 9.9 FL
POTASSIUM SERPL-SCNC: 4.5 MMOL/L
PROT SERPL-MCNC: 6.5 G/DL
RBC # BLD: 4.21 M/UL
RBC # FLD: 12.9 %
SODIUM SERPL-SCNC: 139 MMOL/L
TRIGL SERPL-MCNC: 65 MG/DL
VIRAL LOAD INTERP: NOT DETECTED
VIRAL LOAD LOG: NOT DETECTED LOGCOPIES/ML
WBC # FLD AUTO: 6.78 K/UL

## 2024-10-21 PROCEDURE — 99214 OFFICE O/P EST MOD 30 MIN: CPT

## 2024-10-21 RX ORDER — AZITHROMYCIN 250 MG/1
250 TABLET, FILM COATED ORAL
Qty: 1 | Refills: 0 | Status: ACTIVE | COMMUNITY
Start: 2024-10-21 | End: 1900-01-01

## 2024-10-22 LAB
CD16+CD56+ CELLS # BLD: 403 /UL
CD16+CD56+ CELLS NFR BLD: 11 %
CD19 CELLS NFR BLD: 1375 /UL
CD3 CELLS # BLD: 1752 /UL
CD3 CELLS NFR BLD: 49 %
CD3+CD4+ CELLS # BLD: 883 /UL
CD3+CD4+ CELLS NFR BLD: 25 %
CD3+CD4+ CELLS/CD3+CD8+ CLL SPEC: 1 RATIO
CD3+CD8+ CELLS # SPEC: 881 /UL
CD3+CD8+ CELLS NFR BLD: 25 %
CELLS.CD3-CD19+/CELLS IN BLOOD: 39 %
T PALLIDUM AB SER QL IA: NEGATIVE

## 2024-11-18 ENCOUNTER — APPOINTMENT (OUTPATIENT)
Dept: INFECTIOUS DISEASE | Facility: CLINIC | Age: 49
End: 2024-11-18

## 2024-11-18 ENCOUNTER — OUTPATIENT (OUTPATIENT)
Dept: OUTPATIENT SERVICES | Facility: HOSPITAL | Age: 49
LOS: 1 days | End: 2024-11-18
Payer: COMMERCIAL

## 2024-11-18 DIAGNOSIS — B20 HUMAN IMMUNODEFICIENCY VIRUS [HIV] DISEASE: ICD-10-CM

## 2024-11-18 PROCEDURE — 99213 OFFICE O/P EST LOW 20 MIN: CPT

## 2024-12-02 ENCOUNTER — APPOINTMENT (OUTPATIENT)
Dept: INFECTIOUS DISEASE | Facility: CLINIC | Age: 49
End: 2024-12-02

## 2024-12-02 ENCOUNTER — OUTPATIENT (OUTPATIENT)
Dept: OUTPATIENT SERVICES | Facility: HOSPITAL | Age: 49
LOS: 1 days | End: 2024-12-02

## 2024-12-02 ENCOUNTER — MED ADMIN CHARGE (OUTPATIENT)
Age: 49
End: 2024-12-02

## 2024-12-02 VITALS
BODY MASS INDEX: 21.97 KG/M2 | OXYGEN SATURATION: 99 % | HEART RATE: 74 BPM | WEIGHT: 140 LBS | SYSTOLIC BLOOD PRESSURE: 112 MMHG | DIASTOLIC BLOOD PRESSURE: 79 MMHG | HEIGHT: 67 IN | TEMPERATURE: 98.2 F | RESPIRATION RATE: 14 BRPM

## 2024-12-02 DIAGNOSIS — Z23 ENCOUNTER FOR IMMUNIZATION: ICD-10-CM

## 2024-12-02 DIAGNOSIS — B20 HUMAN IMMUNODEFICIENCY VIRUS [HIV] DISEASE: ICD-10-CM

## 2025-01-10 ENCOUNTER — OUTPATIENT (OUTPATIENT)
Dept: OUTPATIENT SERVICES | Facility: HOSPITAL | Age: 50
LOS: 1 days | End: 2025-01-10
Payer: COMMERCIAL

## 2025-01-10 ENCOUNTER — APPOINTMENT (OUTPATIENT)
Dept: PULMONOLOGY | Facility: CLINIC | Age: 50
End: 2025-01-10
Payer: COMMERCIAL

## 2025-01-10 VITALS
BODY MASS INDEX: 22.91 KG/M2 | HEIGHT: 67 IN | HEART RATE: 80 BPM | OXYGEN SATURATION: 100 % | WEIGHT: 146 LBS | DIASTOLIC BLOOD PRESSURE: 80 MMHG | SYSTOLIC BLOOD PRESSURE: 123 MMHG | TEMPERATURE: 97.9 F

## 2025-01-10 DIAGNOSIS — J44.9 CHRONIC OBSTRUCTIVE PULMONARY DISEASE, UNSPECIFIED: ICD-10-CM

## 2025-01-10 DIAGNOSIS — R06.02 SHORTNESS OF BREATH: ICD-10-CM

## 2025-01-10 PROCEDURE — 99204 OFFICE O/P NEW MOD 45 MIN: CPT

## 2025-01-10 RX ORDER — TIOTROPIUM BROMIDE INHALATION SPRAY 3.12 UG/1
2.5 SPRAY, METERED RESPIRATORY (INHALATION)
Qty: 3 | Refills: 3 | Status: ACTIVE | COMMUNITY
Start: 2025-01-10 | End: 1900-01-01

## 2025-01-10 RX ORDER — ALBUTEROL SULFATE 90 UG/1
108 (90 BASE) INHALANT RESPIRATORY (INHALATION)
Qty: 1 | Refills: 5 | Status: ACTIVE | COMMUNITY
Start: 2025-01-10 | End: 1900-01-01

## 2025-01-14 DIAGNOSIS — R06.02 SHORTNESS OF BREATH: ICD-10-CM

## 2025-01-14 DIAGNOSIS — J44.9 CHRONIC OBSTRUCTIVE PULMONARY DISEASE, UNSPECIFIED: ICD-10-CM

## 2025-01-27 ENCOUNTER — APPOINTMENT (OUTPATIENT)
Dept: INFECTIOUS DISEASE | Facility: CLINIC | Age: 50
End: 2025-01-27

## 2025-01-27 ENCOUNTER — OUTPATIENT (OUTPATIENT)
Dept: OUTPATIENT SERVICES | Facility: HOSPITAL | Age: 50
LOS: 1 days | End: 2025-01-27
Payer: COMMERCIAL

## 2025-01-27 DIAGNOSIS — B20 HUMAN IMMUNODEFICIENCY VIRUS [HIV] DISEASE: ICD-10-CM

## 2025-01-27 DIAGNOSIS — Z21 ASYMPTOMATIC HUMAN IMMUNODEFICIENCY VIRUS [HIV] INFECTION STATUS: ICD-10-CM

## 2025-01-27 PROCEDURE — 98009 SYNCH AUDIO-ONLY NEW LOW 30: CPT

## 2025-02-05 ENCOUNTER — OUTPATIENT (OUTPATIENT)
Dept: OUTPATIENT SERVICES | Facility: HOSPITAL | Age: 50
LOS: 1 days | End: 2025-02-05
Payer: COMMERCIAL

## 2025-02-05 PROCEDURE — 99396 PREV VISIT EST AGE 40-64: CPT

## 2025-02-17 DIAGNOSIS — Z21 ASYMPTOMATIC HUMAN IMMUNODEFICIENCY VIRUS [HIV] INFECTION STATUS: ICD-10-CM

## 2025-02-21 ENCOUNTER — OUTPATIENT (OUTPATIENT)
Dept: OUTPATIENT SERVICES | Facility: HOSPITAL | Age: 50
LOS: 1 days | End: 2025-02-21
Payer: COMMERCIAL

## 2025-02-21 PROCEDURE — G9012: CPT

## 2025-02-24 ENCOUNTER — OUTPATIENT (OUTPATIENT)
Dept: OUTPATIENT SERVICES | Facility: HOSPITAL | Age: 50
LOS: 1 days | End: 2025-02-24
Payer: COMMERCIAL

## 2025-02-24 ENCOUNTER — APPOINTMENT (OUTPATIENT)
Dept: INFECTIOUS DISEASE | Facility: CLINIC | Age: 50
End: 2025-02-24

## 2025-02-24 ENCOUNTER — NON-APPOINTMENT (OUTPATIENT)
Age: 50
End: 2025-02-24

## 2025-02-24 DIAGNOSIS — Z21 ASYMPTOMATIC HUMAN IMMUNODEFICIENCY VIRUS [HIV] INFECTION STATUS: ICD-10-CM

## 2025-02-24 PROCEDURE — 98014 SYNCH AUDIO-ONLY EST MOD 30: CPT

## 2025-02-26 ENCOUNTER — OUTPATIENT (OUTPATIENT)
Dept: OUTPATIENT SERVICES | Facility: HOSPITAL | Age: 50
LOS: 1 days | End: 2025-02-26

## 2025-02-26 DIAGNOSIS — E78.5 HYPERLIPIDEMIA, UNSPECIFIED: ICD-10-CM

## 2025-02-26 DIAGNOSIS — Z00.00 ENCOUNTER FOR GENERAL ADULT MEDICAL EXAMINATION WITHOUT ABNORMAL FINDINGS: ICD-10-CM

## 2025-02-27 DIAGNOSIS — B20 HUMAN IMMUNODEFICIENCY VIRUS [HIV] DISEASE: ICD-10-CM

## 2025-02-27 DIAGNOSIS — E78.5 HYPERLIPIDEMIA, UNSPECIFIED: ICD-10-CM

## 2025-02-27 DIAGNOSIS — Z00.00 ENCOUNTER FOR GENERAL ADULT MEDICAL EXAMINATION WITHOUT ABNORMAL FINDINGS: ICD-10-CM

## 2025-03-17 ENCOUNTER — OUTPATIENT (OUTPATIENT)
Dept: OUTPATIENT SERVICES | Facility: HOSPITAL | Age: 50
LOS: 1 days | End: 2025-03-17
Payer: COMMERCIAL

## 2025-03-17 ENCOUNTER — APPOINTMENT (OUTPATIENT)
Dept: INFECTIOUS DISEASE | Facility: CLINIC | Age: 50
End: 2025-03-17

## 2025-03-17 DIAGNOSIS — Z21 ASYMPTOMATIC HUMAN IMMUNODEFICIENCY VIRUS [HIV] INFECTION STATUS: ICD-10-CM

## 2025-03-17 PROCEDURE — 98014 SYNCH AUDIO-ONLY EST MOD 30: CPT

## 2025-04-07 ENCOUNTER — OUTPATIENT (OUTPATIENT)
Dept: OUTPATIENT SERVICES | Facility: HOSPITAL | Age: 50
LOS: 1 days | End: 2025-04-07

## 2025-04-07 ENCOUNTER — APPOINTMENT (OUTPATIENT)
Dept: INFECTIOUS DISEASE | Facility: CLINIC | Age: 50
End: 2025-04-07

## 2025-04-07 ENCOUNTER — MED ADMIN CHARGE (OUTPATIENT)
Age: 50
End: 2025-04-07

## 2025-04-07 VITALS
WEIGHT: 142 LBS | HEART RATE: 66 BPM | OXYGEN SATURATION: 100 % | DIASTOLIC BLOOD PRESSURE: 80 MMHG | BODY MASS INDEX: 22.29 KG/M2 | SYSTOLIC BLOOD PRESSURE: 120 MMHG | TEMPERATURE: 97.9 F | RESPIRATION RATE: 14 BRPM | HEIGHT: 67 IN

## 2025-04-07 DIAGNOSIS — Z00.00 ENCOUNTER FOR GENERAL ADULT MEDICAL EXAMINATION WITHOUT ABNORMAL FINDINGS: ICD-10-CM

## 2025-04-07 DIAGNOSIS — Z21 ASYMPTOMATIC HUMAN IMMUNODEFICIENCY VIRUS [HIV] INFECTION STATUS: ICD-10-CM

## 2025-04-11 ENCOUNTER — APPOINTMENT (OUTPATIENT)
Dept: INFECTIOUS DISEASE | Facility: CLINIC | Age: 50
End: 2025-04-11

## 2025-04-15 ENCOUNTER — OUTPATIENT (OUTPATIENT)
Dept: OUTPATIENT SERVICES | Facility: HOSPITAL | Age: 50
LOS: 1 days | End: 2025-04-15
Payer: COMMERCIAL

## 2025-04-15 DIAGNOSIS — Z21 ASYMPTOMATIC HUMAN IMMUNODEFICIENCY VIRUS [HIV] INFECTION STATUS: ICD-10-CM

## 2025-04-15 PROCEDURE — 99396 PREV VISIT EST AGE 40-64: CPT

## 2025-04-25 DIAGNOSIS — Z21 ASYMPTOMATIC HUMAN IMMUNODEFICIENCY VIRUS [HIV] INFECTION STATUS: ICD-10-CM

## 2025-05-08 ENCOUNTER — OUTPATIENT (OUTPATIENT)
Dept: OUTPATIENT SERVICES | Facility: HOSPITAL | Age: 50
LOS: 1 days | End: 2025-05-08
Payer: MEDICARE

## 2025-05-08 ENCOUNTER — NON-APPOINTMENT (OUTPATIENT)
Age: 50
End: 2025-05-08

## 2025-05-08 ENCOUNTER — APPOINTMENT (OUTPATIENT)
Dept: OBGYN | Facility: CLINIC | Age: 50
End: 2025-05-08

## 2025-05-08 VITALS — SYSTOLIC BLOOD PRESSURE: 111 MMHG | DIASTOLIC BLOOD PRESSURE: 76 MMHG | BODY MASS INDEX: 22.4 KG/M2 | WEIGHT: 143 LBS

## 2025-05-08 DIAGNOSIS — Z00.00 ENCOUNTER FOR GENERAL ADULT MEDICAL EXAMINATION W/OUT ABNORMAL FINDINGS: ICD-10-CM

## 2025-05-08 DIAGNOSIS — Z01.419 ENCOUNTER FOR GYNECOLOGICAL EXAMINATION (GENERAL) (ROUTINE) WITHOUT ABNORMAL FINDINGS: ICD-10-CM

## 2025-05-08 PROCEDURE — 99459 PELVIC EXAMINATION: CPT

## 2025-05-08 PROCEDURE — 88112 CYTOPATH CELL ENHANCE TECH: CPT | Mod: 26

## 2025-05-08 PROCEDURE — 99386 PREV VISIT NEW AGE 40-64: CPT | Mod: GY,25

## 2025-05-08 PROCEDURE — 88142 CYTOPATH C/V THIN LAYER: CPT

## 2025-05-08 PROCEDURE — 88112 CYTOPATH CELL ENHANCE TECH: CPT

## 2025-05-08 PROCEDURE — 99396 PREV VISIT EST AGE 40-64: CPT

## 2025-05-09 DIAGNOSIS — Z00.00 ENCOUNTER FOR GENERAL ADULT MEDICAL EXAMINATION WITHOUT ABNORMAL FINDINGS: ICD-10-CM

## 2025-05-12 ENCOUNTER — OUTPATIENT (OUTPATIENT)
Dept: OUTPATIENT SERVICES | Facility: HOSPITAL | Age: 50
LOS: 1 days | End: 2025-05-12

## 2025-05-12 ENCOUNTER — APPOINTMENT (OUTPATIENT)
Dept: INFECTIOUS DISEASE | Facility: CLINIC | Age: 50
End: 2025-05-12

## 2025-05-12 DIAGNOSIS — Z21 ASYMPTOMATIC HUMAN IMMUNODEFICIENCY VIRUS [HIV] INFECTION STATUS: ICD-10-CM

## 2025-05-13 ENCOUNTER — LABORATORY RESULT (OUTPATIENT)
Age: 50
End: 2025-05-13

## 2025-05-13 ENCOUNTER — OUTPATIENT (OUTPATIENT)
Dept: OUTPATIENT SERVICES | Facility: HOSPITAL | Age: 50
LOS: 1 days | End: 2025-05-13
Payer: MEDICARE

## 2025-05-13 DIAGNOSIS — Z00.00 ENCOUNTER FOR GENERAL ADULT MEDICAL EXAMINATION WITHOUT ABNORMAL FINDINGS: ICD-10-CM

## 2025-05-13 PROCEDURE — 87624 HPV HI-RISK TYP POOLED RSLT: CPT

## 2025-05-14 DIAGNOSIS — Z00.00 ENCOUNTER FOR GENERAL ADULT MEDICAL EXAMINATION WITHOUT ABNORMAL FINDINGS: ICD-10-CM

## 2025-05-15 DIAGNOSIS — Z01.419 ENCOUNTER FOR GYNECOLOGICAL EXAMINATION (GENERAL) (ROUTINE) WITHOUT ABNORMAL FINDINGS: ICD-10-CM

## 2025-05-16 DIAGNOSIS — Z01.419 ENCOUNTER FOR GYNECOLOGICAL EXAMINATION (GENERAL) (ROUTINE) WITHOUT ABNORMAL FINDINGS: ICD-10-CM

## 2025-05-16 LAB
ANAL PAP CYTOLOGY: NORMAL
HPV HIGH+LOW RISK DNA PNL CVX: NOT DETECTED

## 2025-05-21 ENCOUNTER — OUTPATIENT (OUTPATIENT)
Dept: OUTPATIENT SERVICES | Facility: HOSPITAL | Age: 50
LOS: 1 days | End: 2025-05-21
Payer: COMMERCIAL

## 2025-05-21 ENCOUNTER — APPOINTMENT (OUTPATIENT)
Dept: INFECTIOUS DISEASE | Facility: CLINIC | Age: 50
End: 2025-05-21

## 2025-05-21 ENCOUNTER — APPOINTMENT (OUTPATIENT)
Dept: PULMONOLOGY | Facility: HOSPITAL | Age: 50
End: 2025-05-21

## 2025-05-21 DIAGNOSIS — J20.9 ACUTE BRONCHITIS, UNSPECIFIED: ICD-10-CM

## 2025-05-21 DIAGNOSIS — B20 HUMAN IMMUNODEFICIENCY VIRUS [HIV] DISEASE: ICD-10-CM

## 2025-05-21 DIAGNOSIS — Z21 ASYMPTOMATIC HUMAN IMMUNODEFICIENCY VIRUS [HIV] INFECTION STATUS: ICD-10-CM

## 2025-05-21 PROCEDURE — 98014 SYNCH AUDIO-ONLY EST MOD 30: CPT

## 2025-05-21 RX ORDER — AZITHROMYCIN 250 MG/1
250 TABLET, FILM COATED ORAL
Qty: 6 | Refills: 0 | Status: ACTIVE | COMMUNITY
Start: 2025-05-21 | End: 1900-01-01

## 2025-06-03 ENCOUNTER — APPOINTMENT (OUTPATIENT)
Dept: PULMONOLOGY | Facility: HOSPITAL | Age: 50
End: 2025-06-03

## 2025-06-03 ENCOUNTER — OUTPATIENT (OUTPATIENT)
Dept: OUTPATIENT SERVICES | Facility: HOSPITAL | Age: 50
LOS: 1 days | End: 2025-06-03
Payer: COMMERCIAL

## 2025-06-03 DIAGNOSIS — R06.02 SHORTNESS OF BREATH: ICD-10-CM

## 2025-06-03 PROCEDURE — 94664 DEMO&/EVAL PT USE INHALER: CPT

## 2025-06-03 PROCEDURE — 94726 PLETHYSMOGRAPHY LUNG VOLUMES: CPT

## 2025-06-03 PROCEDURE — 94070 EVALUATION OF WHEEZING: CPT

## 2025-06-03 PROCEDURE — 94729 DIFFUSING CAPACITY: CPT

## 2025-06-04 DIAGNOSIS — R06.02 SHORTNESS OF BREATH: ICD-10-CM

## 2025-06-27 ENCOUNTER — OUTPATIENT (OUTPATIENT)
Dept: OUTPATIENT SERVICES | Facility: HOSPITAL | Age: 50
LOS: 1 days | End: 2025-06-27
Payer: MEDICARE

## 2025-06-27 ENCOUNTER — RESULT REVIEW (OUTPATIENT)
Age: 50
End: 2025-06-27

## 2025-06-27 DIAGNOSIS — R91.8 OTHER NONSPECIFIC ABNORMAL FINDING OF LUNG FIELD: ICD-10-CM

## 2025-06-27 DIAGNOSIS — Z00.8 ENCOUNTER FOR OTHER GENERAL EXAMINATION: ICD-10-CM

## 2025-06-27 PROCEDURE — 71271 CT THORAX LUNG CANCER SCR C-: CPT

## 2025-06-27 PROCEDURE — 71271 CT THORAX LUNG CANCER SCR C-: CPT | Mod: 26

## 2025-06-28 DIAGNOSIS — R91.8 OTHER NONSPECIFIC ABNORMAL FINDING OF LUNG FIELD: ICD-10-CM

## 2025-07-16 ENCOUNTER — APPOINTMENT (OUTPATIENT)
Dept: INFECTIOUS DISEASE | Facility: CLINIC | Age: 50
End: 2025-07-16

## 2025-07-16 ENCOUNTER — OUTPATIENT (OUTPATIENT)
Dept: OUTPATIENT SERVICES | Facility: HOSPITAL | Age: 50
LOS: 1 days | End: 2025-07-16
Payer: COMMERCIAL

## 2025-07-16 DIAGNOSIS — Z21 ASYMPTOMATIC HUMAN IMMUNODEFICIENCY VIRUS [HIV] INFECTION STATUS: ICD-10-CM

## 2025-07-16 DIAGNOSIS — B20 HUMAN IMMUNODEFICIENCY VIRUS [HIV] DISEASE: ICD-10-CM

## 2025-07-16 PROCEDURE — 98015 SYNCH AUDIO-ONLY EST HIGH 40: CPT

## 2025-07-24 ENCOUNTER — OUTPATIENT (OUTPATIENT)
Dept: OUTPATIENT SERVICES | Facility: HOSPITAL | Age: 50
LOS: 1 days | End: 2025-07-24
Payer: COMMERCIAL

## 2025-07-24 DIAGNOSIS — K02.9 DENTAL CARIES, UNSPECIFIED: ICD-10-CM

## 2025-07-24 PROCEDURE — D0140: CPT

## 2025-07-24 PROCEDURE — D7140: CPT

## 2025-07-24 PROCEDURE — D0330: CPT

## 2025-07-30 DIAGNOSIS — K02.9 DENTAL CARIES, UNSPECIFIED: ICD-10-CM

## 2025-08-12 ENCOUNTER — LABORATORY RESULT (OUTPATIENT)
Age: 50
End: 2025-08-12

## 2025-08-12 ENCOUNTER — APPOINTMENT (OUTPATIENT)
Dept: SURGERY | Facility: CLINIC | Age: 50
End: 2025-08-12
Payer: MEDICARE

## 2025-08-12 VITALS
TEMPERATURE: 97 F | OXYGEN SATURATION: 97 % | WEIGHT: 142 LBS | HEIGHT: 67 IN | SYSTOLIC BLOOD PRESSURE: 106 MMHG | BODY MASS INDEX: 22.29 KG/M2 | DIASTOLIC BLOOD PRESSURE: 68 MMHG | HEART RATE: 95 BPM

## 2025-08-12 DIAGNOSIS — K62.82 DYSPLASIA OF ANUS: ICD-10-CM

## 2025-08-12 PROCEDURE — 99203 OFFICE O/P NEW LOW 30 MIN: CPT

## 2025-09-12 ENCOUNTER — APPOINTMENT (OUTPATIENT)
Dept: PULMONOLOGY | Facility: CLINIC | Age: 50
End: 2025-09-12
Payer: MEDICARE

## 2025-09-12 VITALS
HEART RATE: 71 BPM | SYSTOLIC BLOOD PRESSURE: 106 MMHG | DIASTOLIC BLOOD PRESSURE: 71 MMHG | BODY MASS INDEX: 22.29 KG/M2 | HEIGHT: 67 IN | WEIGHT: 142 LBS | OXYGEN SATURATION: 98 % | TEMPERATURE: 98.1 F

## 2025-09-12 DIAGNOSIS — J44.9 CHRONIC OBSTRUCTIVE PULMONARY DISEASE, UNSPECIFIED: ICD-10-CM

## 2025-09-12 DIAGNOSIS — R06.02 SHORTNESS OF BREATH: ICD-10-CM

## 2025-09-12 DIAGNOSIS — R91.1 SOLITARY PULMONARY NODULE: ICD-10-CM

## 2025-09-12 PROCEDURE — G2211 COMPLEX E/M VISIT ADD ON: CPT

## 2025-09-12 PROCEDURE — 99203 OFFICE O/P NEW LOW 30 MIN: CPT

## 2025-09-13 ENCOUNTER — NON-APPOINTMENT (OUTPATIENT)
Age: 50
End: 2025-09-13

## 2025-09-15 ENCOUNTER — APPOINTMENT (OUTPATIENT)
Dept: INFECTIOUS DISEASE | Facility: CLINIC | Age: 50
End: 2025-09-15

## 2025-09-19 LAB
ALBUMIN SERPL ELPH-MCNC: 4.2 G/DL
ALP BLD-CCNC: 80 U/L
ALT SERPL-CCNC: 12 U/L
ANION GAP SERPL CALC-SCNC: 12 MMOL/L
AST SERPL-CCNC: 16 U/L
BILIRUB SERPL-MCNC: 0.3 MG/DL
BUN SERPL-MCNC: 8 MG/DL
CALCIUM SERPL-MCNC: 9.2 MG/DL
CHLORIDE SERPL-SCNC: 105 MMOL/L
CHOLEST SERPL-MCNC: 122 MG/DL
CO2 SERPL-SCNC: 21 MMOL/L
CREAT SERPL-MCNC: 0.9 MG/DL
EGFRCR SERPLBLD CKD-EPI 2021: 77 ML/MIN/1.73M2
GLUCOSE SERPL-MCNC: 101 MG/DL
HCT VFR BLD CALC: 36.1 %
HDLC SERPL-MCNC: 51 MG/DL
HGB BLD-MCNC: 12.2 G/DL
LDLC SERPL-MCNC: 57 MG/DL
MCHC RBC-ENTMCNC: 30.9 PG
MCHC RBC-ENTMCNC: 33.8 G/DL
MCV RBC AUTO: 91.4 FL
NONHDLC SERPL-MCNC: 71 MG/DL
PLATELET # BLD AUTO: 271 K/UL
PMV BLD AUTO: 0 /100 WBCS
PMV BLD: 9.9 FL
POTASSIUM SERPL-SCNC: 4.3 MMOL/L
PROT SERPL-MCNC: 6.5 G/DL
RBC # BLD: 3.95 M/UL
RBC # FLD: 12.5 %
SODIUM SERPL-SCNC: 138 MMOL/L
TRIGL SERPL-MCNC: 64 MG/DL
WBC # FLD AUTO: 6.19 K/UL

## 2025-09-20 LAB
CD16+CD56+ CELLS # BLD: 312 /UL
CD16+CD56+ CELLS NFR BLD: 8 %
CD19 CELLS NFR BLD: 1393 /UL
CD3 CELLS # BLD: 1932 /UL
CD3 CELLS NFR BLD: 53 %
CD3+CD4+ CELLS # BLD: 1009 /UL
CD3+CD4+ CELLS NFR BLD: 27 %
CD3+CD4+ CELLS/CD3+CD8+ CLL SPEC: 1.06 RATIO
CD3+CD8+ CELLS # SPEC: 953 /UL
CD3+CD8+ CELLS NFR BLD: 26 %
CELLS.CD3-CD19+/CELLS IN BLOOD: 38 %
HIV1 RNA # SERPL NAA+PROBE: NOT DETECTED COPIES/ML
T PALLIDUM AB SER QL IA: NEGATIVE
VIABILITY: NORMAL
VIRAL LOAD INTERP: NOT DETECTED
VIRAL LOAD LOG: NOT DETECTED LOGCOPIES/ML